# Patient Record
Sex: FEMALE | Race: WHITE | Employment: OTHER | URBAN - METROPOLITAN AREA
[De-identification: names, ages, dates, MRNs, and addresses within clinical notes are randomized per-mention and may not be internally consistent; named-entity substitution may affect disease eponyms.]

---

## 2018-02-23 ENCOUNTER — HOSPITAL ENCOUNTER (OUTPATIENT)
Dept: LAB | Age: 73
Discharge: HOME | End: 2018-02-23
Payer: MEDICARE

## 2018-02-23 LAB
ALBUMIN SERPL-MCNC: 3.7 G/DL (ref 3.4–5)
ALBUMIN/GLOB SERPL: 1.1 {RATIO} (ref 0.8–1.7)
ALP SERPL-CCNC: 90 U/L (ref 45–117)
ALT SERPL-CCNC: 24 U/L (ref 13–56)
ANION GAP SERPL CALC-SCNC: 6 MMOL/L (ref 3–18)
AST SERPL-CCNC: 21 U/L (ref 15–37)
BASOPHILS # BLD: 0 K/UL (ref 0–0.1)
BASOPHILS NFR BLD: 0 % (ref 0–2)
BILIRUB SERPL-MCNC: 0.7 MG/DL (ref 0.2–1)
BUN SERPL-MCNC: 13 MG/DL (ref 7–18)
BUN/CREAT SERPL: 17 (ref 12–20)
CALCIUM SERPL-MCNC: 9 MG/DL (ref 8.5–10.1)
CHLORIDE SERPL-SCNC: 108 MMOL/L (ref 100–108)
CO2 SERPL-SCNC: 30 MMOL/L (ref 21–32)
CREAT SERPL-MCNC: 0.75 MG/DL (ref 0.6–1.3)
DIFFERENTIAL METHOD BLD: ABNORMAL
EOSINOPHIL # BLD: 0.1 K/UL (ref 0–0.4)
EOSINOPHIL NFR BLD: 2 % (ref 0–5)
ERYTHROCYTE [DISTWIDTH] IN BLOOD BY AUTOMATED COUNT: 13.9 % (ref 11.6–14.5)
GLOBULIN SER CALC-MCNC: 3.4 G/DL (ref 2–4)
GLUCOSE SERPL-MCNC: 86 MG/DL (ref 74–99)
HCT VFR BLD AUTO: 42.4 % (ref 35–45)
HGB BLD-MCNC: 13.4 G/DL (ref 12–16)
LYMPHOCYTES # BLD: 1.6 K/UL (ref 0.9–3.6)
LYMPHOCYTES NFR BLD: 26 % (ref 21–52)
MCH RBC QN AUTO: 28.9 PG (ref 24–34)
MCHC RBC AUTO-ENTMCNC: 31.6 G/DL (ref 31–37)
MCV RBC AUTO: 91.4 FL (ref 74–97)
MONOCYTES # BLD: 0.5 K/UL (ref 0.05–1.2)
MONOCYTES NFR BLD: 9 % (ref 3–10)
NEUTS SEG # BLD: 3.7 K/UL (ref 1.8–8)
NEUTS SEG NFR BLD: 63 % (ref 40–73)
PLATELET # BLD AUTO: 203 K/UL (ref 135–420)
PMV BLD AUTO: 12.7 FL (ref 9.2–11.8)
POTASSIUM SERPL-SCNC: 4.9 MMOL/L (ref 3.5–5.5)
PROT SERPL-MCNC: 7.1 G/DL (ref 6.4–8.2)
RBC # BLD AUTO: 4.64 M/UL (ref 4.2–5.3)
SODIUM SERPL-SCNC: 144 MMOL/L (ref 136–145)
TSH SERPL DL<=0.05 MIU/L-ACNC: 1.06 UIU/ML (ref 0.36–3.74)
WBC # BLD AUTO: 5.9 K/UL (ref 4.6–13.2)

## 2018-02-23 PROCEDURE — 84443 ASSAY THYROID STIM HORMONE: CPT | Performed by: NURSE PRACTITIONER

## 2018-02-23 PROCEDURE — 36415 COLL VENOUS BLD VENIPUNCTURE: CPT | Performed by: NURSE PRACTITIONER

## 2018-02-23 PROCEDURE — 85025 COMPLETE CBC W/AUTO DIFF WBC: CPT | Performed by: NURSE PRACTITIONER

## 2018-02-23 PROCEDURE — 80053 COMPREHEN METABOLIC PANEL: CPT | Performed by: NURSE PRACTITIONER

## 2018-06-05 ENCOUNTER — ANESTHESIA EVENT (OUTPATIENT)
Dept: ENDOSCOPY | Age: 73
End: 2018-06-05
Payer: MEDICARE

## 2018-06-05 RX ORDER — FLUVASTATIN SODIUM 80 MG/1
TABLET, FILM COATED, EXTENDED RELEASE ORAL
COMMUNITY
End: 2022-08-22

## 2018-06-05 RX ORDER — CETIRIZINE HCL 10 MG
TABLET ORAL DAILY
COMMUNITY

## 2018-06-05 RX ORDER — LOSARTAN POTASSIUM 50 MG/1
100 TABLET ORAL
COMMUNITY

## 2018-06-06 ENCOUNTER — HOSPITAL ENCOUNTER (OUTPATIENT)
Age: 73
Setting detail: OUTPATIENT SURGERY
Discharge: HOME | End: 2018-06-06
Attending: INTERNAL MEDICINE | Admitting: INTERNAL MEDICINE
Payer: MEDICARE

## 2018-06-06 ENCOUNTER — ANESTHESIA (OUTPATIENT)
Dept: ENDOSCOPY | Age: 73
End: 2018-06-06
Payer: MEDICARE

## 2018-06-06 VITALS
SYSTOLIC BLOOD PRESSURE: 127 MMHG | TEMPERATURE: 97.4 F | WEIGHT: 211.4 LBS | BODY MASS INDEX: 36.09 KG/M2 | DIASTOLIC BLOOD PRESSURE: 62 MMHG | OXYGEN SATURATION: 97 % | RESPIRATION RATE: 20 BRPM | HEART RATE: 51 BPM | HEIGHT: 64 IN

## 2018-06-06 PROCEDURE — 74011250637 HC RX REV CODE- 250/637: Performed by: NURSE ANESTHETIST, CERTIFIED REGISTERED

## 2018-06-06 PROCEDURE — 88305 TISSUE EXAM BY PATHOLOGIST: CPT | Performed by: INTERNAL MEDICINE

## 2018-06-06 PROCEDURE — 88342 IMHCHEM/IMCYTCHM 1ST ANTB: CPT | Performed by: INTERNAL MEDICINE

## 2018-06-06 PROCEDURE — 76040000019: Performed by: INTERNAL MEDICINE

## 2018-06-06 PROCEDURE — 77030018846 HC SOL IRR STRL H20 ICUM -A: Performed by: INTERNAL MEDICINE

## 2018-06-06 PROCEDURE — 76060000031 HC ANESTHESIA FIRST 0.5 HR: Performed by: INTERNAL MEDICINE

## 2018-06-06 PROCEDURE — 74011250636 HC RX REV CODE- 250/636: Performed by: NURSE ANESTHETIST, CERTIFIED REGISTERED

## 2018-06-06 PROCEDURE — 77030008565 HC TBNG SUC IRR ERBE -B: Performed by: INTERNAL MEDICINE

## 2018-06-06 PROCEDURE — 74011250636 HC RX REV CODE- 250/636

## 2018-06-06 RX ORDER — SODIUM CHLORIDE 0.9 % (FLUSH) 0.9 %
5-10 SYRINGE (ML) INJECTION AS NEEDED
Status: DISCONTINUED | OUTPATIENT
Start: 2018-06-06 | End: 2018-06-06 | Stop reason: HOSPADM

## 2018-06-06 RX ORDER — FAMOTIDINE 20 MG/1
20 TABLET, FILM COATED ORAL ONCE
Status: COMPLETED | OUTPATIENT
Start: 2018-06-06 | End: 2018-06-06

## 2018-06-06 RX ORDER — SODIUM CHLORIDE 0.9 % (FLUSH) 0.9 %
5-10 SYRINGE (ML) INJECTION EVERY 8 HOURS
Status: DISCONTINUED | OUTPATIENT
Start: 2018-06-06 | End: 2018-06-06 | Stop reason: HOSPADM

## 2018-06-06 RX ORDER — PROPOFOL 10 MG/ML
INJECTION, EMULSION INTRAVENOUS
Status: DISCONTINUED | OUTPATIENT
Start: 2018-06-06 | End: 2018-06-06 | Stop reason: HOSPADM

## 2018-06-06 RX ORDER — FENTANYL CITRATE 50 UG/ML
25 INJECTION, SOLUTION INTRAMUSCULAR; INTRAVENOUS AS NEEDED
Status: CANCELLED | OUTPATIENT
Start: 2018-06-06

## 2018-06-06 RX ORDER — ONDANSETRON 2 MG/ML
4 INJECTION INTRAMUSCULAR; INTRAVENOUS ONCE
Status: CANCELLED | OUTPATIENT
Start: 2018-06-06 | End: 2018-06-06

## 2018-06-06 RX ORDER — SODIUM CHLORIDE, SODIUM LACTATE, POTASSIUM CHLORIDE, CALCIUM CHLORIDE 600; 310; 30; 20 MG/100ML; MG/100ML; MG/100ML; MG/100ML
75 INJECTION, SOLUTION INTRAVENOUS CONTINUOUS
Status: CANCELLED | OUTPATIENT
Start: 2018-06-06

## 2018-06-06 RX ORDER — INSULIN LISPRO 100 [IU]/ML
INJECTION, SOLUTION INTRAVENOUS; SUBCUTANEOUS ONCE
Status: DISCONTINUED | OUTPATIENT
Start: 2018-06-06 | End: 2018-06-06 | Stop reason: HOSPADM

## 2018-06-06 RX ORDER — DIPHENHYDRAMINE HYDROCHLORIDE 50 MG/ML
12.5 INJECTION, SOLUTION INTRAMUSCULAR; INTRAVENOUS
Status: CANCELLED | OUTPATIENT
Start: 2018-06-06

## 2018-06-06 RX ORDER — NALBUPHINE HYDROCHLORIDE 10 MG/ML
5 INJECTION, SOLUTION INTRAMUSCULAR; INTRAVENOUS; SUBCUTANEOUS
Status: CANCELLED | OUTPATIENT
Start: 2018-06-06

## 2018-06-06 RX ORDER — SODIUM CHLORIDE, SODIUM LACTATE, POTASSIUM CHLORIDE, CALCIUM CHLORIDE 600; 310; 30; 20 MG/100ML; MG/100ML; MG/100ML; MG/100ML
75 INJECTION, SOLUTION INTRAVENOUS CONTINUOUS
Status: DISCONTINUED | OUTPATIENT
Start: 2018-06-06 | End: 2018-06-06 | Stop reason: HOSPADM

## 2018-06-06 RX ORDER — PROPOFOL 10 MG/ML
INJECTION, EMULSION INTRAVENOUS AS NEEDED
Status: DISCONTINUED | OUTPATIENT
Start: 2018-06-06 | End: 2018-06-06 | Stop reason: HOSPADM

## 2018-06-06 RX ORDER — LIDOCAINE HYDROCHLORIDE 10 MG/ML
0.1 INJECTION, SOLUTION EPIDURAL; INFILTRATION; INTRACAUDAL; PERINEURAL AS NEEDED
Status: DISCONTINUED | OUTPATIENT
Start: 2018-06-06 | End: 2018-06-06 | Stop reason: HOSPADM

## 2018-06-06 RX ORDER — SODIUM CHLORIDE 0.9 % (FLUSH) 0.9 %
5-10 SYRINGE (ML) INJECTION AS NEEDED
Status: CANCELLED | OUTPATIENT
Start: 2018-06-06

## 2018-06-06 RX ADMIN — FAMOTIDINE 20 MG: 20 TABLET ORAL at 07:42

## 2018-06-06 RX ADMIN — SODIUM CHLORIDE, SODIUM LACTATE, POTASSIUM CHLORIDE, AND CALCIUM CHLORIDE 75 ML/HR: 600; 310; 30; 20 INJECTION, SOLUTION INTRAVENOUS at 07:43

## 2018-06-06 RX ADMIN — PROPOFOL 50 MG: 10 INJECTION, EMULSION INTRAVENOUS at 08:23

## 2018-06-06 RX ADMIN — PROPOFOL 50 MG: 10 INJECTION, EMULSION INTRAVENOUS at 08:17

## 2018-06-06 RX ADMIN — PROPOFOL 100 MCG/KG/MIN: 10 INJECTION, EMULSION INTRAVENOUS at 08:17

## 2018-06-06 NOTE — H&P
[facility]    [email or web address]      Impression:   1. IRIS  Intermittent vomiting  Solid food dysphagia    Plan:     1. EGD and dilate as needed      Chief Complaint: N/V IRIS      HPI:  Marissa Killian is a 74 y.o. female who is being seen on consult for the above. She was on PPI w/ partial relief but notes worsening of Sxs of the PPI. Her wt has decreased by 5 lb. She has post prandially vomiting and early satiety. .    PMH:   Past Medical History:   Diagnosis Date    GERD (gastroesophageal reflux disease)     High cholesterol     Hypertension        PSH:   Past Surgical History:   Procedure Laterality Date    HX CHOLECYSTECTOMY      HX COLONOSCOPY      HX HEMORRHOIDECTOMY      HX HYSTERECTOMY         Social HX:   Social History     Social History    Marital status:      Spouse name: N/A    Number of children: N/A    Years of education: N/A     Occupational History    Not on file. Social History Main Topics    Smoking status: Never Smoker    Smokeless tobacco: Never Used    Alcohol use No    Drug use: No    Sexual activity: Not on file     Other Topics Concern    Not on file     Social History Narrative       FHX:   History reviewed. No pertinent family history. Allergy:   Allergies   Allergen Reactions    Sulfa (Sulfonamide Antibiotics) Other (comments)     Constipation       Home Medications:     Prescriptions Prior to Admission   Medication Sig    losartan (COZAAR) 50 mg tablet Take  by mouth nightly.  fluvastatin XL (LESCOL XL) 80 mg SR tablet Take  by mouth nightly.  cetirizine (ZYRTEC) 10 mg tablet Take  by mouth daily. Review of Systems:     Constitutional: No fevers, chills, weight loss, fatigue. Skin: No rashes, pruritis, jaundice, ulcerations, erythema. HENT: No headaches, nosebleeds, sinus pressure, rhinorrhea, sore throat. Eyes: No visual changes, blurred vision, eye pain, photophobia, jaundice. Cardiovascular: No chest pain, heart palpitations. Respiratory: No cough, SOB, wheezing, chest discomfort, orthopnea. Gastrointestinal: See above   Genitourinary: No dysuria, bleeding, discharge, pyuria. Musculoskeletal: No weakness, arthralgias, wasting. Endo: No sweats. Heme: No bruising, easy bleeding. Allergies: As noted. Neurological: Cranial nerves intact. Alert and oriented. Gait not assessed. Psychiatric:  No anxiety, depression, hallucinations. Visit Vitals    /83    Pulse (!) 59    Temp 97.7 °F (36.5 °C)    Resp 20    Ht 5' 4\" (1.626 m)    Wt 95.9 kg (211 lb 6.4 oz)    SpO2 97%    Breastfeeding No    BMI 36.29 kg/m2       Physical Assessment:     constitutional: appearance: well developed, well nourished, normal habitus, no deformities, in no acute distress. skin: inspection: no rashes, ulcers, icterus or other lesions; no clubbing or telangiectasias. palpation: no induration or subcutaneos nodules. eyes: inspection: normal conjunctivae and lids; no jaundice pupils: symmetrical, normoreactive to light, normal accommodation and size. ENMT: mouth: normal oral mucosa,lips and gums; good dentition. oropharynx: normal tongue, hard and soft palate; posterior pharynx without erithema, exudate or lesions. neck: thyroid: normal size, consistency and position; no masses or tenderness. respiratory: effort: normal chest excursion; no intercostal retraction or accessory muscle use. cardiovascular: abdominal aorta: normal size and position; no bruits. palpation: PMI of normal size and position; normal rhythm; no thrill or murmurs. abdominal: abdomen: normal consistency; no tenderness or masses. hernias: no hernias appreciated. liver: normal size and consistency. spleen: not palpable. rectal: hemoccult/guaiac: not performed. musculoskeletal: digits and nails: no clubbing, cyanosis, petechiae or other inflammatory conditions.  gait: normal gait and station head and neck: normal range of motion; no pain, crepitation or contracture. spine/ribs/pelvis: normal range of motion; no pain, deformity or contracture. lymphatic: axilae: not palpable. groin: not palpable. neck: within normal limits. other: not palpable. neurologic: cranial nerves: II-XII normal.   psychiatric: judgement/insight: within normal limits. memory: within normal limits for recent and remote events. mood and affect: no evidence of depression, anxiety or agitation. orientation: oriented to time, space and person. Basic Metabolic Profile   No results for input(s): NA, K, CL, CO2, BUN, GLU, CA, MG, PHOS in the last 72 hours. No lab exists for component: CREAT      CBC w/Diff    No results for input(s): WBC, RBC, HGB, HCT, MCV, MCH, MCHC, RDW, PLT, HGBEXT, HCTEXT, PLTEXT in the last 72 hours. No lab exists for component: MPV No results for input(s): GRANS, LYMPH, EOS, PRO, MYELO, METAS, BLAST in the last 72 hours. No lab exists for component: MONO, BASO     Hepatic Function   No results for input(s): ALB, TP, TBILI, GPT, SGOT, AP, AML, LPSE in the last 72 hours. No lab exists for component: Zach Middleton MD, M.D. [facility], [address] [place] [address]  [email or web address]

## 2018-06-06 NOTE — DISCHARGE INSTRUCTIONS
Esophageal Dilation: What to Expect at [address] [place] [address]  After you have esophageal dilation, you will stay at the hospital or surgery center for 1 to 2 hours. This will allow the medicine to wear off. You will be able to go home after your doctor or nurse checks to make sure you are not having any problems. This care sheet gives you a general idea about how long it will take for you to recover. But each person recovers at a different pace. Follow the steps below to get better as quickly as possible. How can you care for yourself at home? Activity  ? · Rest as much as you need to after you go home. ? · You should be able to go back to your usual activities the day after the procedure. Diet  ? · Follow your doctor's directions for eating after the procedure. ? · Drink plenty of fluids (unless your doctor has told you not to). Medicines  ? · Your doctor will tell you if and when you can restart your medicines. He or she will also give you instructions about taking any new medicines. ? · If you take blood thinners, such as warfarin (Coumadin), clopidogrel (Plavix), or aspirin, be sure to talk to your doctor. He or she will tell you if and when to start taking those medicines again. Make sure that you understand exactly what your doctor wants you to do. ? · If you have a sore throat the day after the procedure, use an over-the-counter spray to numb your throat. Sucking on throat lozenges and gargling with warm salt water may also help relieve your symptoms. Follow-up care is a key part of your treatment and safety. Be sure to make and go to all appointments, and call your doctor if you are having problems. It's also a good idea to know your test results and keep a list of the medicines you take. When should you call for help? Call 911 anytime you think you may need emergency care. For example, call if:  ? · You passed out (lost consciousness). ? · You have trouble breathing.    ? · Your stools are maroon or very bloody   ? Call your doctor now or seek immediate medical care if:  ? · You have new or worse belly pain. ? · You have a fever. ? · You have new or more blood in your stools. ? · You are sick to your stomach and cannot drink fluids. ? · You cannot pass stools or gas. ? · You have pain that does not get better after you take pain medicine. ? Watch closely for changes in your health, and be sure to contact your doctor if:  ? · Your throat still hurts after a day or two. ? · You do not get better as expected. Where can you learn more? Go to http://ethan-roberta.info/. Enter I798 in the search box to learn more about \"Esophageal Dilation: What to Expect at Home. \"  Current as of: May 12, 2017  Content Version: 11.4  © 3112-3180 [employer]. Care instructions adapted under license by [employer] (which disclaims liability or warranty for this information). If you have questions about a medical condition or this instruction, always ask your healthcare professional. [address] any warranty or liability for your use of this information. Gastritis: Care Instructions  Your Care Instructions    Gastritis is a sore and upset stomach. It happens when something irritates the stomach lining. Many things can cause it. These include an infection such as the flu or something you ate or drank. Medicines or a sore on the lining of the stomach (ulcer) also can cause it. Your belly may bloat and ache. You may belch, vomit, and feel sick to your stomach. You should be able to relieve the problem by taking medicine. And it may help to change your diet. If gastritis lasts, your doctor may prescribe medicine. Follow-up care is a key part of your treatment and safety. Be sure to make and go to all appointments, and call your doctor if you are having problems.  It's also a good idea to know your test results and keep a list of the medicines you take.  How can you care for yourself at home? · If your doctor prescribed antibiotics, take them as directed. Do not stop taking them just because you feel better. You need to take the full course of antibiotics. · Be safe with medicines. If your doctor prescribed medicine to decrease stomach acid, take it as directed. Call your doctor if you think you are having a problem with your medicine. · Do not take any other medicine, including over-the-counter pain relievers, without talking to your doctor first.  · If your doctor recommends over-the-counter medicine to reduce stomach acid, such as Pepcid AC, Prilosec, Tagamet HB, or Zantac 75, follow the directions on the label. · Drink plenty of fluids (enough so that your urine is light yellow or clear like water) to prevent dehydration. Choose water and other caffeine-free clear liquids. If you have kidney, heart, or liver disease and have to limit fluids, talk with your doctor before you increase the amount of fluids you drink. · Limit how much alcohol you drink. · Avoid coffee, tea, cola drinks, chocolate, and other foods with caffeine. They increase stomach acid. When should you call for help? Call 911 anytime you think you may need emergency care. For example, call if:  ? · You vomit blood or what looks like coffee grounds. ? · You pass maroon or very bloody stools. ?Call your doctor now or seek immediate medical care if:  ? · You start breathing fast and have not produced urine in the last 8 hours. ? · You cannot keep fluids down. ? Watch closely for changes in your health, and be sure to contact your doctor if:  ? · You do not get better as expected. Where can you learn more? Go to http://ethan-roberta.info/. Enter [id number] in the search box to learn more about \"Gastritis: Care Instructions. \"  Current as of: May 12, 2017  Content Version: 11.4  © 1700-2509 [employer].  Care instructions adapted under license by [facility] (which disclaims liability or warranty for this information). If you have questions about a medical condition or this instruction, always ask your healthcare professional. [address] any warranty or liability for your use of this information. DISCHARGE SUMMARY from Nurse    PATIENT INSTRUCTIONS:    After general anesthesia or intravenous sedation, for 24 hours or while taking prescription Narcotics:  · Limit your activities  · Do not drive and operate hazardous machinery  · Do not make important personal or business decisions  · Do  not drink alcoholic beverages  · If you have not urinated within 8 hours after discharge, please contact your surgeon on call. *  Please give a list of your current medications to your Primary Care Provider. *  Please update this list whenever your medications are discontinued, doses are      changed, or new medications (including over-the-counter products) are added. *  Please carry medication information at all times in case of emergency situations. These are general instructions for a healthy lifestyle:    No smoking/ No tobacco products/ Avoid exposure to second hand smoke  Surgeon General's Warning:  Quitting smoking now greatly reduces serious risk to your health. Obesity, smoking, and sedentary lifestyle greatly increases your risk for illness    A healthy diet, regular physical exercise & weight monitoring are important for maintaining a healthy lifestyle    You may be retaining fluid if you have a history of heart failure or if you experience any of the following symptoms:  Weight gain of 3 pounds or more overnight or 5 pounds in a week, increased swelling in our hands or feet or shortness of breath while lying flat in bed. Please call your doctor as soon as you notice any of these symptoms; do not wait until your next office visit.     Recognize signs and symptoms of STROKE:    F-face looks uneven    A-arms unable to move or move unevenly    S-speech slurred or non-existent    T-time-call 911 as soon as signs and symptoms begin-DO NOT go       Back to bed or wait to see if you get better-TIME IS BRAIN. Warning Signs of HEART ATTACK     Call 911 if you have these symptoms:   Chest discomfort. Most heart attacks involve discomfort in the center of the chest that lasts more than a few minutes, or that goes away and comes back. It can feel like uncomfortable pressure, squeezing, fullness, or pain.  Discomfort in other areas of the upper body. Symptoms can include pain or discomfort in one or both arms, the back, neck, jaw, or stomach.  Shortness of breath with or without chest discomfort.  Other signs may include breaking out in a cold sweat, nausea, or lightheadedness. Don't wait more than five minutes to call 911 - MINUTES MATTER! Fast action can save your life. Calling 911 is almost always the fastest way to get lifesaving treatment. Emergency Medical Services staff can begin treatment when they arrive -- up to an hour sooner than if someone gets to the hospital by car. The discharge information has been reviewed with the patient. The patient verbalized understanding. Discharge medications reviewed with the patient and appropriate educational materials and side effects teaching were provided. ___________________________________________________________________________________________________________________________________  Colon Polyps: Care Instructions  Your Care Instructions    Colon polyps are growths in the colon or the rectum. The cause of most colon polyps is not known, and most people who get them do not have any problems. But a certain kind can turn into cancer. For this reason, regular testing for colon polyps is important for people age 48 and older and anyone who has an increased risk for colon cancer. Polyps are usually found through routine colon cancer screening tests.  Although most colon polyps are not cancerous, they are usually removed and then tested for cancer. Screening for colon cancer saves lives because the cancer can usually be cured if it is caught early. If you have a polyp that is the type that can turn into cancer, you may need more tests to examine your entire colon. The doctor will remove any other polyps that he or she finds, and you will be tested more often. Follow-up care is a key part of your treatment and safety. Be sure to make and go to all appointments, and call your doctor if you are having problems. It's also a good idea to know your test results and keep a list of the medicines you take. How can you care for yourself at home? Regular exams to look for colon polyps are the best way to prevent polyps from turning into colon cancer. These can include stool tests, sigmoidoscopy, colonoscopy, and CT colonography. Talk with your doctor about a testing schedule that is right for you. To prevent polyps  There is no home treatment that can prevent colon polyps. But these steps may help lower your risk for cancer. · Stay active. Being active can help you get to and stay at a healthy weight. Try to exercise on most days of the week. Walking is a good choice. · Eat well. Choose a variety of vegetables, fruits, legumes (such as peas and beans), fish, poultry, and whole grains. · Do not smoke. If you need help quitting, talk to your doctor about stop-smoking programs and medicines. These can increase your chances of quitting for good. · If you drink alcohol, limit how much you drink. Limit alcohol to 2 drinks a day for men and 1 drink a day for women. When should you call for help? Call your doctor now or seek immediate medical care if:  ? · You have severe belly pain. ? · Your stools are maroon or very bloody. ? Watch closely for changes in your health, and be sure to contact your doctor if:  ? · You have a fever. ? · You have nausea or vomiting.    ? · You have a change in bowel habits (new constipation or diarrhea). ? · Your symptoms get worse or are not improving as expected. Where can you learn more? Go to http://ethan-roberta.info/. Enter 95 [id number] in the search box to learn more about \"Colon Polyps: Care Instructions. \"  Current as of: May 12, 2017  Content Version: 11.4  © 4859-7050 [employer]. Care instructions adapted under license by [employer] (which disclaims liability or warranty for this information). If you have questions about a medical condition or this instruction, always ask your healthcare professional. [place] [address] any warranty or liability for your use of this information.

## 2018-06-06 NOTE — ANESTHESIA PREPROCEDURE EVALUATION
Anesthetic History   No history of anesthetic complications            Review of Systems / Medical History  Patient summary reviewed and pertinent labs reviewed    Pulmonary  Within defined limits                 Neuro/Psych   Within defined limits           Cardiovascular    Hypertension: well controlled          Hyperlipidemia    Exercise tolerance: >4 METS     GI/Hepatic/Renal     GERD           Endo/Other        Obesity     Other Findings   Comments: Documentation of current medication  Current medications obtained, documented and obtained? YES      Risk Factors for Postoperative nausea/vomiting:       History of postoperative nausea/vomiting? NO       Female? YES       Motion sickness? NO       Intended opioid administration for postoperative analgesia? NO      Smoking Abstinence:  Current Smoker? NO  Elective Surgery? YES  Seen preoperatively by anesthesiologist or proxy prior to day of surgery? YES  Pt abstained from smoking 24 hours prior to anesthesia?  YES    Preventive care/screening for High Blood Pressure:  Aged 18 years and older: YES  Screened for high blood pressure: YES  Patients with high blood pressure referred to primary care provider   for BP management: YES                 Physical Exam    Airway  Mallampati: IV  TM Distance: > 6 cm  Neck ROM: normal range of motion   Mouth opening: Normal     Cardiovascular  Regular rate and rhythm,  S1 and S2 normal,  no murmur, click, rub, or gallop             Dental  No notable dental hx       Pulmonary  Breath sounds clear to auscultation               Abdominal  GI exam deferred       Other Findings            Anesthetic Plan    ASA: 3  Anesthesia type: MAC          Induction: Intravenous  Anesthetic plan and risks discussed with: Patient

## 2018-06-06 NOTE — PERIOP NOTES
I have reviewed discharge instructions with the patient and son, Eden Sanchez. The patient and son verbalized understanding. Patient armband removed and shredded.

## 2018-06-06 NOTE — IP AVS SNAPSHOT
[address] [facility] 
 
 
 [address] Patient: Nicole Barraza MRN: [id number] :1945 About your hospitalization You were admitted on:  2018 You last received care in the:  [facility] PHASE 2 RECOVERY You were discharged on:  2018 Why you were hospitalized Your primary diagnosis was:  Not on File Follow-up Information Follow up With Details Comments Contact Info Jessica Chanel DO   [address] [facility] 
[phone] Susana Pelletier MD  Follow up within 8 to 12 weeks [address] [place] [address] Drive [address] 706 [facility] 
[phone] Discharge Orders None A check rian indicates which time of day the medication should be taken. My Medications CONTINUE taking these medications Instructions Each Dose to Equal  
 Morning Noon Evening Bedtime LESCOL XL 80 mg SR tablet Generic drug:  fluvastatin XL Your last dose was: Your next dose is: Take  by mouth nightly. losartan 50 mg tablet Commonly known as:  COZAAR Your last dose was: Your next dose is: Take  by mouth nightly. ZyrTEC 10 mg tablet Generic drug:  cetirizine Your last dose was: Your next dose is: Take  by mouth daily. Discharge Instructions Esophageal Dilation: What to Expect at [facility] Your Recovery After you have esophageal dilation, you will stay at the hospital or surgery center for 1 to 2 hours. This will allow the medicine to wear off. You will be able to go home after your doctor or nurse checks to make sure you are not having any problems. This care sheet gives you a general idea about how long it will take for you to recover. But each person recovers at a different pace.  Follow the steps below to get better as quickly as possible. How can you care for yourself at home? Activity ? · Rest as much as you need to after you go home. ? · You should be able to go back to your usual activities the day after the procedure. Diet ? · Follow your doctor's directions for eating after the procedure. ? · Drink plenty of fluids (unless your doctor has told you not to). Medicines ? · Your doctor will tell you if and when you can restart your medicines. He or she will also give you instructions about taking any new medicines. ? · If you take blood thinners, such as warfarin (Coumadin), clopidogrel (Plavix), or aspirin, be sure to talk to your doctor. He or she will tell you if and when to start taking those medicines again. Make sure that you understand exactly what your doctor wants you to do. ? · If you have a sore throat the day after the procedure, use an over-the-counter spray to numb your throat. Sucking on throat lozenges and gargling with warm salt water may also help relieve your symptoms. Follow-up care is a key part of your treatment and safety. Be sure to make and go to all appointments, and call your doctor if you are having problems. It's also a good idea to know your test results and keep a list of the medicines you take. When should you call for help? Call 911 anytime you think you may need emergency care. For example, call if: 
? · You passed out (lost consciousness). ? · You have trouble breathing. ? · Your stools are maroon or very bloody ? Call your doctor now or seek immediate medical care if: 
? · You have new or worse belly pain. ? · You have a fever. ? · You have new or more blood in your stools. ? · You are sick to your stomach and cannot drink fluids. ? · You cannot pass stools or gas. ? · You have pain that does not get better after you take pain medicine. ? Watch closely for changes in your health, and be sure to contact your doctor if: ? · Your throat still hurts after a day or two. ? · You do not get better as expected. Where can you learn more? Go to http://ethan-roberta.info/. Enter Z386 in the search box to learn more about \"Esophageal Dilation: What to Expect at Home. \" Current as of: May 12, 2017 Content Version: 11.4 © 1626-2751 [employer]. Care instructions adapted under license by [employer] (which disclaims liability or warranty for this information). If you have questions about a medical condition or this instruction, always ask your healthcare professional. [place] [address] any warranty or liability for your use of this information. Gastritis: Care Instructions Your Care Instructions Gastritis is a sore and upset stomach. It happens when something irritates the stomach lining. Many things can cause it. These include an infection such as the flu or something you ate or drank. Medicines or a sore on the lining of the stomach (ulcer) also can cause it. Your belly may bloat and ache. You may belch, vomit, and feel sick to your stomach. You should be able to relieve the problem by taking medicine. And it may help to change your diet. If gastritis lasts, your doctor may prescribe medicine. Follow-up care is a key part of your treatment and safety. Be sure to make and go to all appointments, and call your doctor if you are having problems. It's also a good idea to know your test results and keep a list of the medicines you take. How can you care for yourself at home? · If your doctor prescribed antibiotics, take them as directed. Do not stop taking them just because you feel better. You need to take the full course of antibiotics. · Be safe with medicines. If your doctor prescribed medicine to decrease stomach acid, take it as directed. Call your doctor if you think you are having a problem with your medicine. · Do not take any other medicine, including over-the-counter pain relievers, without talking to your doctor first. 
· If your doctor recommends over-the-counter medicine to reduce stomach acid, such as Pepcid AC, Prilosec, Tagamet HB, or Zantac 75, follow the directions on the label. · Drink plenty of fluids (enough so that your urine is light yellow or clear like water) to prevent dehydration. Choose water and other caffeine-free clear liquids. If you have kidney, heart, or liver disease and have to limit fluids, talk with your doctor before you increase the amount of fluids you drink. · Limit how much alcohol you drink. · Avoid coffee, tea, cola drinks, chocolate, and other foods with caffeine. They increase stomach acid. When should you call for help? Call 911 anytime you think you may need emergency care. For example, call if: 
? · You vomit blood or what looks like coffee grounds. ? · You pass maroon or very bloody stools. ?Call your doctor now or seek immediate medical care if: 
? · You start breathing fast and have not produced urine in the last 8 hours. ? · You cannot keep fluids down. ? Watch closely for changes in your health, and be sure to contact your doctor if: 
? · You do not get better as expected. Where can you learn more? Go to http://ethan-roberat.info/. Enter [id number] in the search box to learn more about \"Gastritis: Care Instructions. \" Current as of: May 12, 2017 Content Version: 11.4 © 6850-8639 [employer]. Care instructions adapted under license by [employer] (which disclaims liability or warranty for this information). If you have questions about a medical condition or this instruction, always ask your healthcare professional. [place] [address] any warranty or liability for your use of this information. DISCHARGE SUMMARY from Nurse PATIENT INSTRUCTIONS: 
 
 After general anesthesia or intravenous sedation, for 24 hours or while taking prescription Narcotics: · Limit your activities · Do not drive and operate hazardous machinery · Do not make important personal or business decisions · Do  not drink alcoholic beverages · If you have not urinated within 8 hours after discharge, please contact your surgeon on call. *  Please give a list of your current medications to your Primary Care Provider. *  Please update this list whenever your medications are discontinued, doses are 
    changed, or new medications (including over-the-counter products) are added. *  Please carry medication information at all times in case of emergency situations. These are general instructions for a healthy lifestyle: No smoking/ No tobacco products/ Avoid exposure to second hand smoke Surgeon General's Warning:  Quitting smoking now greatly reduces serious risk to your health. Obesity, smoking, and sedentary lifestyle greatly increases your risk for illness A healthy diet, regular physical exercise & weight monitoring are important for maintaining a healthy lifestyle You may be retaining fluid if you have a history of heart failure or if you experience any of the following symptoms:  Weight gain of 3 pounds or more overnight or 5 pounds in a week, increased swelling in our hands or feet or shortness of breath while lying flat in bed. Please call your doctor as soon as you notice any of these symptoms; do not wait until your next office visit. Recognize signs and symptoms of STROKE: 
 
F-face looks uneven A-arms unable to move or move unevenly S-speech slurred or non-existent T-time-call 911 as soon as signs and symptoms begin-DO NOT go Back to bed or wait to see if you get better-TIME IS BRAIN. Warning Signs of HEART ATTACK Call 911 if you have these symptoms: 
? Chest discomfort.  Most heart attacks involve discomfort in the center of the chest that lasts more than a few minutes, or that goes away and comes back. It can feel like uncomfortable pressure, squeezing, fullness, or pain. ? Discomfort in other areas of the upper body. Symptoms can include pain or discomfort in one or both arms, the back, neck, jaw, or stomach. ? Shortness of breath with or without chest discomfort. ? Other signs may include breaking out in a cold sweat, nausea, or lightheadedness. Don't wait more than five minutes to call [address]! Fast action can save your life. Calling 911 is almost always the fastest way to get lifesaving treatment. Emergency Medical Services staff can begin treatment when they arrive  up to an hour sooner than if someone gets to the hospital by car. The discharge information has been reviewed with the patient. The patient verbalized understanding. Discharge medications reviewed with the patient and appropriate educational materials and side effects teaching were provided. ___________________________________________________________________________________________________________________________________ Colon Polyps: Care Instructions Your Care Instructions Colon polyps are growths in the colon or the rectum. The cause of most colon polyps is not known, and most people who get them do not have any problems. But a certain kind can turn into cancer. For this reason, regular testing for colon polyps is important for people age 48 and older and anyone who has an increased risk for colon cancer. Polyps are usually found through routine colon cancer screening tests. Although most colon polyps are not cancerous, they are usually removed and then tested for cancer. Screening for colon cancer saves lives because the cancer can usually be cured if it is caught early. If you have a polyp that is the type that can turn into cancer, you may need more tests to examine your entire colon.  The doctor will remove any other polyps that he or she finds, and you will be tested more often. Follow-up care is a key part of your treatment and safety. Be sure to make and go to all appointments, and call your doctor if you are having problems. It's also a good idea to know your test results and keep a list of the medicines you take. How can you care for yourself at home? Regular exams to look for colon polyps are the best way to prevent polyps from turning into colon cancer. These can include stool tests, sigmoidoscopy, colonoscopy, and CT colonography. Talk with your doctor about a testing schedule that is right for you. To prevent polyps There is no home treatment that can prevent colon polyps. But these steps may help lower your risk for cancer. · Stay active. Being active can help you get to and stay at a healthy weight. Try to exercise on most days of the week. Walking is a good choice. · Eat well. Choose a variety of vegetables, fruits, legumes (such as peas and beans), fish, poultry, and whole grains. · Do not smoke. If you need help quitting, talk to your doctor about stop-smoking programs and medicines. These can increase your chances of quitting for good. · If you drink alcohol, limit how much you drink. Limit alcohol to 2 drinks a day for men and 1 drink a day for women. When should you call for help? Call your doctor now or seek immediate medical care if: 
? · You have severe belly pain. ? · Your stools are maroon or very bloody. ? Watch closely for changes in your health, and be sure to contact your doctor if: 
? · You have a fever. ? · You have nausea or vomiting. ? · You have a change in bowel habits (new constipation or diarrhea). ? · Your symptoms get worse or are not improving as expected. Where can you learn more? Go to http://ethan-roberta.info/. Enter [id number] in the search box to learn more about \"Colon Polyps: Care Instructions. \" Current as of: May 12, 2017 Content Version: 11.4 © 2431-5893 Healthwise, [employer]. Care instructions adapted under license by [employer] (which disclaims liability or warranty for this information). If you have questions about a medical condition or this instruction, always ask your healthcare professional. [address] any warranty or liability for your use of this information. Introducing \[facility] [place] [facility]\"" & HEALTH SERVICES! [employer] introduces Tabtor patient portal. Now you can access parts of your medical record, email your doctor's office, and request medication refills online. 1. In your internet browser, go to [email or web address] 2. Click on the First Time User? Click Here link in the Sign In box. You will see the New Member Sign Up page. 3. Enter your Tabtor Access Code exactly as it appears below. You will not need to use this code after youve completed the sign-up process. If you do not sign up before the expiration date, you must request a new code. · Tabtor Access Code: [email or web address] Expires: 9/3/2018 10:55 AM 
 
4. Enter the last four digits of your Social Security Number (xxxx) and Date of Birth (mm/dd/yyyy) as indicated and click Submit. You will be taken to the next sign-up page. 5. Create a Tabtor ID. This will be your Tabtor login ID and cannot be changed, so think of one that is secure and easy to remember. 6. Create a Tabtor password. You can change your password at any time. 7. Enter your Password Reset Question and Answer. This can be used at a later time if you forget your password. 8. Enter your e-mail address. You will receive e-mail notification when new information is available in [address] 9. Click Sign Up. You can now view and download portions of your medical record. 10. Click the Download Summary menu link to download a portable copy of your medical information. If you have questions, please visit the Frequently Asked Questions section of the MyChart website. Remember, [employer] is NOT to be used for urgent needs. For medical emergencies, dial 911. Now available from your iPhone and Android! Introducing Jatinder Horn As a [employer] patient, I wanted to make you aware of our electronic visit tool called Jatinder Horn. [employer] 24/7 allows you to connect within minutes with a medical provider 24 hours a day, seven days a week via a mobile device or tablet or logging into a secure website from your computer. You can access Jatinder Horn from anywhere in the [place]. A virtual visit might be right for you when you have a simple condition and feel like you just dont want to get out of bed, or cant get away from work for an appointment, when your regular [employer] provider is not available (evenings, weekends or holidays), or when youre out of town and need minor care. Electronic visits cost only $49 and if the [employer] 24/7 provider determines a prescription is needed to treat your condition, one can be electronically transmitted to a nearby pharmacy*. Please take a moment to enroll today if you have not already done so. The enrollment process is free and takes just a few minutes. To enroll, please download the [employer] 24/7 mary to your tablet or phone, or visit [email or web address] to enroll on your computer. And, as an [address] [place] [address] patient with a [employer] account, the results of your visits will be scanned into your electronic medical record and your primary care provider will be able to view the scanned results. We urge you to continue to see your regular [employer] provider for your ongoing medical care.   And while your primary care provider may not be the one available when you seek a Jatinder Horn virtual visit, the peace of mind you get from getting a real diagnosis real time can be priceless. For more information on Jatinder Horn, view our Frequently Asked Questions (FAQs) at [email or web address]. Sincerely, 
 
Sylvie Hoffmann MD 
Chief Medical Officer [address] *:  certain medications cannot be prescribed via Jatinder Horn Providers Seen During Your Hospitalization Provider Specialty Primary office phone Hortensia Love MD Gastroenterology [phone] Your Primary Care Physician (PCP) Primary Care Physician Office Phone Office Fax Lilliamsriramgeovanny Jarquin [phone] You are allergic to the following Allergen Reactions Sulfa (Sulfonamide Antibiotics) Other (comments) Constipation Recent Documentation Height Weight Breastfeeding? BMI OB Status Smoking Status 1.626 m 95.9 kg No 36.29 kg/m2 Hysterectomy Never Smoker Emergency Contacts Name Discharge Info Relation Home Work Mobile Geoffrey Haynes DISCHARGE CAREGIVER [3] Son [22] [phone] Patient Belongings The following personal items are in your possession at time of discharge: 
  Dental Appliances: None  Visual Aid: Glasses Please provide this summary of care documentation to your next provider. Signatures-by signing, you are acknowledging that this After Visit Summary has been reviewed with you and you have received a copy. Patient Signature:  ____________________________________________________________ Date:  ____________________________________________________________  
  
Yanna Mcnamara Provider Signature:  ____________________________________________________________ Date:  ____________________________________________________________

## 2018-06-06 NOTE — ANESTHESIA POSTPROCEDURE EVALUATION
Post-Anesthesia Evaluation and Assessment    Patient: Joelle Capps MRN: [id number]  SSN: [id number]    YOB: 1945  Age: 74 y.o. Sex: female       Cardiovascular Function/Vital Signs  Visit Vitals    /54    Pulse (!) 52    Temp 36.1 °C (97 °F)    Resp 14    Ht 5' 4\" (1.626 m)    Wt 95.9 kg (211 lb 6.4 oz)    SpO2 99%    Breastfeeding No    BMI 36.29 kg/m2       Patient is status post MAC anesthesia for Procedure(s):  ENDOSCOPY with bx's and dilation. Nausea/Vomiting: None    Postoperative hydration reviewed and adequate. Pain:  Pain Scale 1: Visual (06/06/18 0829)  Pain Intensity 1: 0 (06/06/18 0829)   Managed    Neurological Status: At baseline    Mental Status and Level of Consciousness: Arousable    Pulmonary Status:   O2 Device: Room air (06/06/18 0829)   Adequate oxygenation and airway patent    Complications related to anesthesia: None    Post-anesthesia assessment completed.  No concerns        Signed By: Dejon Jackson MD     June 6, 2018

## 2018-08-15 ENCOUNTER — ANESTHESIA EVENT (OUTPATIENT)
Dept: ENDOSCOPY | Age: 73
End: 2018-08-15
Payer: MEDICARE

## 2018-08-16 ENCOUNTER — ANESTHESIA (OUTPATIENT)
Dept: ENDOSCOPY | Age: 73
End: 2018-08-16
Payer: MEDICARE

## 2018-08-16 ENCOUNTER — HOSPITAL ENCOUNTER (OUTPATIENT)
Age: 73
Setting detail: OUTPATIENT SURGERY
Discharge: HOME | End: 2018-08-16
Attending: INTERNAL MEDICINE | Admitting: INTERNAL MEDICINE
Payer: MEDICARE

## 2018-08-16 VITALS
BODY MASS INDEX: 36.19 KG/M2 | SYSTOLIC BLOOD PRESSURE: 130 MMHG | RESPIRATION RATE: 23 BRPM | OXYGEN SATURATION: 100 % | WEIGHT: 212 LBS | TEMPERATURE: 97.5 F | HEIGHT: 64 IN | HEART RATE: 62 BPM | DIASTOLIC BLOOD PRESSURE: 94 MMHG

## 2018-08-16 PROCEDURE — 74011250636 HC RX REV CODE- 250/636: Performed by: NURSE ANESTHETIST, CERTIFIED REGISTERED

## 2018-08-16 PROCEDURE — 76040000019: Performed by: INTERNAL MEDICINE

## 2018-08-16 PROCEDURE — 74011250636 HC RX REV CODE- 250/636

## 2018-08-16 PROCEDURE — 74011000250 HC RX REV CODE- 250: Performed by: NURSE ANESTHETIST, CERTIFIED REGISTERED

## 2018-08-16 PROCEDURE — 74011250637 HC RX REV CODE- 250/637: Performed by: INTERNAL MEDICINE

## 2018-08-16 PROCEDURE — 74011000250 HC RX REV CODE- 250

## 2018-08-16 PROCEDURE — 76060000032 HC ANESTHESIA 0.5 TO 1 HR: Performed by: INTERNAL MEDICINE

## 2018-08-16 RX ORDER — LIDOCAINE HYDROCHLORIDE 20 MG/ML
INJECTION, SOLUTION EPIDURAL; INFILTRATION; INTRACAUDAL; PERINEURAL AS NEEDED
Status: DISCONTINUED | OUTPATIENT
Start: 2018-08-16 | End: 2018-08-16 | Stop reason: HOSPADM

## 2018-08-16 RX ORDER — EPHEDRINE SULFATE/0.9% NACL/PF 25 MG/5 ML
SYRINGE (ML) INTRAVENOUS AS NEEDED
Status: DISCONTINUED | OUTPATIENT
Start: 2018-08-16 | End: 2018-08-16 | Stop reason: HOSPADM

## 2018-08-16 RX ORDER — PROPOFOL 10 MG/ML
INJECTION, EMULSION INTRAVENOUS AS NEEDED
Status: DISCONTINUED | OUTPATIENT
Start: 2018-08-16 | End: 2018-08-16 | Stop reason: HOSPADM

## 2018-08-16 RX ORDER — DEXTROMETHORPHAN/PSEUDOEPHED 2.5-7.5/.8
DROPS ORAL AS NEEDED
Status: DISCONTINUED | OUTPATIENT
Start: 2018-08-16 | End: 2018-08-16 | Stop reason: HOSPADM

## 2018-08-16 RX ORDER — SODIUM CHLORIDE, SODIUM LACTATE, POTASSIUM CHLORIDE, CALCIUM CHLORIDE 600; 310; 30; 20 MG/100ML; MG/100ML; MG/100ML; MG/100ML
50 INJECTION, SOLUTION INTRAVENOUS CONTINUOUS
Status: DISCONTINUED | OUTPATIENT
Start: 2018-08-16 | End: 2018-08-16 | Stop reason: HOSPADM

## 2018-08-16 RX ADMIN — PROPOFOL 50 MG: 10 INJECTION, EMULSION INTRAVENOUS at 10:34

## 2018-08-16 RX ADMIN — PROPOFOL 50 MG: 10 INJECTION, EMULSION INTRAVENOUS at 10:45

## 2018-08-16 RX ADMIN — LIDOCAINE HYDROCHLORIDE 100 MG: 20 INJECTION, SOLUTION EPIDURAL; INFILTRATION; INTRACAUDAL; PERINEURAL at 10:34

## 2018-08-16 RX ADMIN — PROPOFOL 30 MG: 10 INJECTION, EMULSION INTRAVENOUS at 10:36

## 2018-08-16 RX ADMIN — PROPOFOL 30 MG: 10 INJECTION, EMULSION INTRAVENOUS at 10:35

## 2018-08-16 RX ADMIN — FAMOTIDINE 20 MG: 10 INJECTION, SOLUTION INTRAVENOUS at 10:26

## 2018-08-16 RX ADMIN — PROPOFOL 50 MG: 10 INJECTION, EMULSION INTRAVENOUS at 10:37

## 2018-08-16 RX ADMIN — Medication 5 MG: at 10:52

## 2018-08-16 RX ADMIN — SODIUM CHLORIDE, SODIUM LACTATE, POTASSIUM CHLORIDE, AND CALCIUM CHLORIDE 50 ML/HR: 600; 310; 30; 20 INJECTION, SOLUTION INTRAVENOUS at 10:26

## 2018-08-16 NOTE — DISCHARGE INSTRUCTIONS
Colonoscopy: What to Expect at [address] [place] [address]  After you have a colonoscopy, you will stay at the clinic for 1 to 2 hours until the medicines wear off. Then you can go home. But you will need to arrange for a ride. Your doctor will tell you when you can eat and do your other usual activities. Your doctor will talk to you about when you will need your next colonoscopy. Your doctor can help you decide how often you need to be checked. This will depend on the results of your test and your risk for colorectal cancer. After the test, you may be bloated or have gas pains. You may need to pass gas. If a biopsy was done or a polyp was removed, you may have streaks of blood in your stool (feces) for a few days. This care sheet gives you a general idea about how long it will take for you to recover. But each person recovers at a different pace. Follow the steps below to get better as quickly as possible. How can you care for yourself at home? Activity  · Rest when you feel tired. · You can do your normal activities when it feels okay to do so. Diet  · Follow your doctor's directions for eating. · Unless your doctor has told you not to, drink plenty of fluids. This helps to replace the fluids that were lost during the colon prep. · Do not drink alcohol. Medicines  · Your doctor will tell you if and when you can restart your medicines. He or she will also give you instructions about taking any new medicines. · If you take blood thinners, such as warfarin (Coumadin), clopidogrel (Plavix), or aspirin, be sure to talk to your doctor. He or she will tell you if and when to start taking those medicines again. Make sure that you understand exactly what your doctor wants you to do. · If polyps were removed or a biopsy was done during the test, your doctor may tell you not to take aspirin or other anti-inflammatory medicines for a few days. These include ibuprofen (Advil, Motrin) and naproxen (Aleve).   Other instructions  · For your safety, do not drive or operate machinery until the medicine wears off and you can think clearly. Your doctor may tell you not to drive or operate machinery until the day after your test.  · Do not sign legal documents or make major decisions until the medicine wears off and you can think clearly. The anesthesia can make it hard for you to fully understand what you are agreeing to. Follow-up care is a key part of your treatment and safety. Be sure to make and go to all appointments, and call your doctor if you are having problems. It's also a good idea to know your test results and keep a list of the medicines you take. When should you call for help? Call 911 anytime you think you may need emergency care. For example, call if:  · You passed out (lost consciousness). · You pass maroon or bloody stools. · You have severe belly pain. Call your doctor now or seek immediate medical care if:  · Your stools are black and tarlike. · Your stools have streaks of blood, but you did not have a biopsy or any polyps removed. · You have belly pain, or your belly is swollen and firm. · You vomit. · You have a fever. · You are very dizzy. Watch closely for changes in your health, and be sure to contact your doctor if you have any problems. Where can you learn more? Go to [employer]  Enter E264 in the search box to learn more about \"Colonoscopy: What to Expect at Home. \"   © 4824-0283 [employer]. Care instructions adapted under license by [employer] (which disclaims liability or warranty for this information). This care instruction is for use with your licensed healthcare professional. If you have questions about a medical condition or this instruction, always ask your healthcare professional. [place] [address] any warranty or liability for your use of this information.   Content Version: 10.8.448458; Current as of: November 20, 2015                  DISCHARGE SUMMARY from Nurse     POST-PROCEDURE INSTRUCTIONS:    Call your Physician if you:  ? Observe any excess bleeding. ? Develop a temperature over 100.5o F.  ? Experience abdominal, shoulder or chest pain. ? Notice any signs of decreased circulation or nerve impairment to an extremity such as a change in color, persistent numbness, tingling, coldness or increase in pain. ? Vomit blood or you have nausea and vomiting lasting longer than 4 hours. ? Are unable to take medications. ? Are unable to urinate within 8 hours after discharge following general anesthesia or intravenous sedation. For the next 24 hours after receiving general anesthesia or intravenous sedation, or while taking prescription Narcotics, limit your activities:  ? Do NOT drive a motor vehicle, operate hazard machinery or power tools, or perform tasks that require coordination. The medication you received during your procedure may have some effect on your mental awareness. ? Do NOT make important personal or business decisions. The medication you received during your procedure may have some effect on your mental awareness. ? Do NOT drink alcoholic beverages. These drinks do not mix well with the medications that have been given to you. ? Upon discharge from the hospital, you must be accompanied by a responsible adult. ? Resume your diet as directed by your physician. ? Resume medications as your physician has prescribed. ? Please give a list of your current medications to your Primary Care Provider. ? Please update this list whenever your medications are discontinued, doses are changed, or new medications (including over-the-counter products) are added. ? Please carry medication information at all times in case of emergency situations. These are general instructions for a healthy lifestyle:    No smoking/ No tobacco products/ Avoid exposure to second hand smoke.    Surgeon General's Warning: Quitting smoking now greatly reduces serious risk to your health. Obesity, smoking, and a sedentary lifestyle greatly increase your risk for illness.  A healthy diet, regular physical exercise & weight monitoring are important for maintaining a healthy lifestyle   You may be retaining fluid if you have a history of heart failure or if you experience any of the following symptoms:  Weight gain of 3 pounds or more overnight or 5 pounds in a week, increased swelling in our hands or feet or shortness of breath while lying flat in bed. Please call your doctor as soon as you notice any of these symptoms; do not wait until your next office visit. Recognize signs and symptoms of STROKE:  F  -  Face looks uneven  A  -  Arms unable to move or move unevenly  S  -  Speech slurred or non-existent  T  -  Time to call 911 - as soon as signs and symptoms begin - DO NOT go back to bed or wait to see If you get better - TIME IS BRAIN. Colorectal Screening   Colorectal cancer almost always develops from precancerous polyps (abnormal growths) in the colon or rectum. Screening tests can find precancerous polyps, so that they can be removed before they turn into cancer. Screening tests can also find colorectal cancer early, when treatment works best.  Rosanna Speak with your physician about when you should begin screening and how often you should be tested. Educational references and/or instructions provided during this visit included:    normal      APPOINTMENTS:    Please make a follow-up appointment with your physician. Discharge information has been reviewed with the patient. The patient verbalized understanding.

## 2018-08-16 NOTE — ANESTHESIA PREPROCEDURE EVALUATION
Anesthetic History   No history of anesthetic complications            Review of Systems / Medical History  Patient summary reviewed and pertinent labs reviewed    Pulmonary  Within defined limits                 Neuro/Psych   Within defined limits           Cardiovascular    Hypertension          Hyperlipidemia    Exercise tolerance: >4 METS     GI/Hepatic/Renal  Within defined limits              Endo/Other        Morbid obesity     Other Findings   Comments: Documentation of current medication  Current medications obtained, documented and obtained? YES      Risk Factors for Postoperative nausea/vomiting:       History of postoperative nausea/vomiting? NO       Female? NO       Motion sickness? NO       Intended opioid administration for postoperative analgesia? NO      Smoking Abstinence:  Current Smoker? NO  Elective Surgery? YES  Seen preoperatively by anesthesiologist or proxy prior to day of surgery? YES  Pt abstained from smoking 24 hours prior to anesthesia?  N/A    Preventive care/screening for High Blood Pressure:  Aged 18 years and older: YES  Screened for high blood pressure: YES  Patients with high blood pressure referred to primary care provider   for BP management: YES           Physical Exam    Airway  Mallampati: III  TM Distance: 4 - 6 cm  Neck ROM: decreased range of motion, short neck   Mouth opening: Diminished (comment)     Cardiovascular  Regular rate and rhythm,  S1 and S2 normal,  no murmur, click, rub, or gallop  Rhythm: regular  Rate: normal         Dental    Dentition: Lower dentition intact and Upper dentition intact     Pulmonary  Breath sounds clear to auscultation               Abdominal  GI exam deferred       Other Findings            Anesthetic Plan    ASA: 3  Anesthesia type: MAC          Induction: Intravenous  Anesthetic plan and risks discussed with: Patient

## 2018-08-16 NOTE — ANESTHESIA POSTPROCEDURE EVALUATION
Post-Anesthesia Evaluation and Assessment    Patient: Mark Santos MRN: [id number]  SSN: [id number]    YOB: 1945  Age: 74 y.o. Sex: female       Cardiovascular Function/Vital Signs  Visit Vitals    /51    Pulse 69    Temp 36.4 °C (97.5 °F)    Resp 13    Ht 5' 4\" (1.626 m)    Wt 96.2 kg (212 lb)    SpO2 100%    BMI 36.39 kg/m2       Patient is status post MAC anesthesia for Procedure(s):  COLONOSCOPY. Nausea/Vomiting: None    Postoperative hydration reviewed and adequate. Pain:  Pain Scale 1: Numeric (0 - 10) (08/16/18 1017)  Pain Intensity 1: 0 (08/16/18 1017)   Managed    Neurological Status: At baseline    Mental Status and Level of Consciousness: Arousable    Pulmonary Status:   O2 Device: Oxygen mask (08/16/18 1055)   Adequate oxygenation and airway patent    Complications related to anesthesia: None    Post-anesthesia assessment completed.  No concerns    Signed By: Dave Miller MD     August 16, 2018

## 2018-08-16 NOTE — H&P
[facility]    [email or web address]      Impression:   1.crcs      Plan:   colo  1. Chief Complaint:   constipation    HPI:  Caryl Grullon is a 74 y.o. female who is being seen on consult for the above. No bleeding, No FHx of colon CA. Lasdt colo >10 yr ago. Has occ constipation. Aimee Raf PMH:   Past Medical History:   Diagnosis Date    GERD (gastroesophageal reflux disease)     High cholesterol     Hypertension        PSH:   Past Surgical History:   Procedure Laterality Date    HX CHOLECYSTECTOMY      HX COLONOSCOPY      HX HEMORRHOIDECTOMY      HX HYSTERECTOMY         Social HX:   Social History     Social History    Marital status:      Spouse name: N/A    Number of children: N/A    Years of education: N/A     Occupational History    Not on file. Social History Main Topics    Smoking status: Never Smoker    Smokeless tobacco: Never Used    Alcohol use No    Drug use: No    Sexual activity: Not on file     Other Topics Concern    Not on file     Social History Narrative       FHX:   History reviewed. No pertinent family history. Allergy:   Allergies   Allergen Reactions    Sulfa (Sulfonamide Antibiotics) Other (comments)     Constipation       Home Medications:     Prescriptions Prior to Admission   Medication Sig    losartan (COZAAR) 50 mg tablet Take  by mouth nightly.  fluvastatin XL (LESCOL XL) 80 mg SR tablet Take  by mouth nightly.  cetirizine (ZYRTEC) 10 mg tablet Take  by mouth daily. Review of Systems:     Constitutional: No fevers, chills, weight loss, fatigue. Skin: No rashes, pruritis, jaundice, ulcerations, erythema. HENT: No headaches, nosebleeds, sinus pressure, rhinorrhea, sore throat. Eyes: No visual changes, blurred vision, eye pain, photophobia, jaundice. Cardiovascular: No chest pain, heart palpitations. Respiratory: No cough, SOB, wheezing, chest discomfort, orthopnea. Gastrointestinal: See above   Genitourinary: No dysuria, bleeding, discharge, pyuria. Musculoskeletal: No weakness, arthralgias, wasting. Endo: No sweats. Heme: No bruising, easy bleeding. Allergies: As noted. Neurological: Cranial nerves intact. Alert and oriented. Gait not assessed. Psychiatric:  No anxiety, depression, hallucinations. Visit Vitals    /69    Pulse 64    Temp 97.5 °F (36.4 °C)    Resp 20    Ht 5' 4\" (1.626 m)    Wt 96.2 kg (212 lb)    SpO2 98%    BMI 36.39 kg/m2       Physical Assessment:     constitutional: appearance: well developed, well nourished, normal habitus, no deformities, in no acute distress. skin: inspection: no rashes, ulcers, icterus or other lesions; no clubbing or telangiectasias. palpation: no induration or subcutaneos nodules. eyes: inspection: normal conjunctivae and lids; no jaundice pupils: symmetrical, normoreactive to light, normal accommodation and size. ENMT: mouth: normal oral mucosa,lips and gums; good dentition. oropharynx: normal tongue, hard and soft palate; posterior pharynx without erithema, exudate or lesions. neck: thyroid: normal size, consistency and position; no masses or tenderness. respiratory: effort: normal chest excursion; no intercostal retraction or accessory muscle use. cardiovascular: abdominal aorta: normal size and position; no bruits. palpation: PMI of normal size and position; normal rhythm; no thrill or murmurs. abdominal: abdomen: normal consistency; no tenderness or masses. hernias: no hernias appreciated. liver: normal size and consistency. spleen: not palpable. rectal: hemoccult/guaiac: not performed. musculoskeletal: digits and nails: no clubbing, cyanosis, petechiae or other inflammatory conditions. gait: normal gait and station head and neck: normal range of motion; no pain, crepitation or contracture. spine/ribs/pelvis: normal range of motion; no pain, deformity or contracture. lymphatic: axilae: not palpable. groin: not palpable. neck: within normal limits. other: not palpable. neurologic: cranial nerves: II-XII normal.   psychiatric: judgement/insight: within normal limits. memory: within normal limits for recent and remote events. mood and affect: no evidence of depression, anxiety or agitation. orientation: oriented to time, space and person. Basic Metabolic Profile   No results for input(s): NA, K, CL, CO2, BUN, GLU, CA, MG, PHOS in the last 72 hours. No lab exists for component: CREAT      CBC w/Diff    No results for input(s): WBC, RBC, HGB, HCT, MCV, MCH, MCHC, RDW, PLT, HGBEXT, HCTEXT, PLTEXT in the last 72 hours. No lab exists for component: MPV No results for input(s): GRANS, LYMPH, EOS, PRO, MYELO, METAS, BLAST in the last 72 hours. No lab exists for component: MONO, BASO     Hepatic Function   No results for input(s): ALB, TP, TBILI, GPT, SGOT, AP, AML, LPSE in the last 72 hours. No lab exists for component: Janet Ratliff MD, M.D. [facility], [address] [place] [address]  [email or web address]

## 2018-08-16 NOTE — IP AVS SNAPSHOT
[address] [place] [id number] [phone] Patient: Mary Jane Farias MRN: [id number] :1945 A check rian indicates which time of day the medication should be taken. My Medications CONTINUE taking these medications Instructions Each Dose to Equal  
 Morning Noon Evening Bedtime LESCOL XL 80 mg SR tablet Generic drug:  fluvastatin XL Your last dose was: Your next dose is: Take  by mouth nightly. losartan 50 mg tablet Commonly known as:  COZAAR Your last dose was: Your next dose is: Take  by mouth nightly. ZyrTEC 10 mg tablet Generic drug:  cetirizine Your last dose was: Your next dose is: Take  by mouth daily.

## 2018-08-16 NOTE — IP AVS SNAPSHOT
[address] [facility], [place], operated by [facility] [address] [address] 
[phone] Patient: Dora Rod MRN: [id number] :1945 About your hospitalization You were admitted on:  2018 You last received care in the:  [facility] You were discharged on:  2018 Why you were hospitalized Your primary diagnosis was:  Not on File Follow-up Information Follow up With Details Comments Contact Info Aline Gavin MD   [address] [facility] [address] 
[phone] Evelyne Cardozo DO   [address] [facility] [address] [facility] [address] 
[phone] Discharge Orders None A check rian indicates which time of day the medication should be taken. My Medications CONTINUE taking these medications Instructions Each Dose to Equal  
 Morning Noon Evening Bedtime LESCOL XL 80 mg SR tablet Generic drug:  fluvastatin XL Your last dose was: Your next dose is: Take  by mouth nightly. losartan 50 mg tablet Commonly known as:  COZAAR Your last dose was: Your next dose is: Take  by mouth nightly. ZyrTEC 10 mg tablet Generic drug:  cetirizine Your last dose was: Your next dose is: Take  by mouth daily. Discharge Instructions Colonoscopy: What to Expect at [facility] Your Recovery After you have a colonoscopy, you will stay at the clinic for 1 to 2 hours until the medicines wear off. Then you can go home. But you will need to arrange for a ride. Your doctor will tell you when you can eat and do your other usual activities. Your doctor will talk to you about when you will need your next colonoscopy.  Your doctor can help you decide how often you need to be checked. This will depend on the results of your test and your risk for colorectal cancer. After the test, you may be bloated or have gas pains. You may need to pass gas. If a biopsy was done or a polyp was removed, you may have streaks of blood in your stool (feces) for a few days. This care sheet gives you a general idea about how long it will take for you to recover. But each person recovers at a different pace. Follow the steps below to get better as quickly as possible. How can you care for yourself at home? Activity · Rest when you feel tired. · You can do your normal activities when it feels okay to do so. Diet · Follow your doctor's directions for eating. · Unless your doctor has told you not to, drink plenty of fluids. This helps to replace the fluids that were lost during the colon prep. · Do not drink alcohol. Medicines · Your doctor will tell you if and when you can restart your medicines. He or she will also give you instructions about taking any new medicines. · If you take blood thinners, such as warfarin (Coumadin), clopidogrel (Plavix), or aspirin, be sure to talk to your doctor. He or she will tell you if and when to start taking those medicines again. Make sure that you understand exactly what your doctor wants you to do. · If polyps were removed or a biopsy was done during the test, your doctor may tell you not to take aspirin or other anti-inflammatory medicines for a few days. These include ibuprofen (Advil, Motrin) and naproxen (Aleve). Other instructions · For your safety, do not drive or operate machinery until the medicine wears off and you can think clearly. Your doctor may tell you not to drive or operate machinery until the day after your test. 
· Do not sign legal documents or make major decisions until the medicine wears off and you can think clearly. The anesthesia can make it hard for you to fully understand what you are agreeing to. Follow-up care is a key part of your treatment and safety. Be sure to make and go to all appointments, and call your doctor if you are having problems. It's also a good idea to know your test results and keep a list of the medicines you take. When should you call for help? Call 911 anytime you think you may need emergency care. For example, call if: 
· You passed out (lost consciousness). · You pass maroon or bloody stools. · You have severe belly pain. Call your doctor now or seek immediate medical care if: 
· Your stools are black and tarlike. · Your stools have streaks of blood, but you did not have a biopsy or any polyps removed. · You have belly pain, or your belly is swollen and firm. · You vomit. · You have a fever. · You are very dizzy. Watch closely for changes in your health, and be sure to contact your doctor if you have any problems. Where can you learn more? Go to [employer] Enter E264 in the search box to learn more about \"Colonoscopy: What to Expect at Home. \"  
© 1261-3608 Healthwise, Incorporated. Care instructions adapted under license by Romayne Duster (which disclaims liability or warranty for this information). This care instruction is for use with your licensed healthcare professional. If you have questions about a medical condition or this instruction, always ask your healthcare professional. [place] [address] any warranty or liability for your use of this information. Content Version: 15.1.854377; Current as of: November 20, 2015 DISCHARGE SUMMARY from Nurse POST-PROCEDURE INSTRUCTIONS: 
 
Call your Physician if you: 
? Observe any excess bleeding. ? Develop a temperature over 100.5o F. 
? Experience abdominal, shoulder or chest pain. ? Notice any signs of decreased circulation or nerve impairment to an extremity such as a change in color, persistent numbness, tingling, coldness or increase in pain. ? Vomit blood or you have nausea and vomiting lasting longer than 4 hours. ? Are unable to take medications. ? Are unable to urinate within 8 hours after discharge following general anesthesia or intravenous sedation. For the next 24 hours after receiving general anesthesia or intravenous sedation, or while taking prescription Narcotics, limit your activities: 
? Do NOT drive a motor vehicle, operate hazard machinery or power tools, or perform tasks that require coordination. The medication you received during your procedure may have some effect on your mental awareness. ? Do NOT make important personal or business decisions. The medication you received during your procedure may have some effect on your mental awareness. ? Do NOT drink alcoholic beverages. These drinks do not mix well with the medications that have been given to you. ? Upon discharge from the hospital, you must be accompanied by a responsible adult. ? Resume your diet as directed by your physician. ? Resume medications as your physician has prescribed. ? Please give a list of your current medications to your Primary Care Provider. ? Please update this list whenever your medications are discontinued, doses are changed, or new medications (including over-the-counter products) are added. ? Please carry medication information at all times in case of emergency situations. These are general instructions for a healthy lifestyle: No smoking/ No tobacco products/ Avoid exposure to second hand smoke. ? Surgeon General's Warning:  Quitting smoking now greatly reduces serious risk to your health. Obesity, smoking, and a sedentary lifestyle greatly increase your risk for illness. ? A healthy diet, regular physical exercise & weight monitoring are important for maintaining a healthy lifestyle ?  You may be retaining fluid if you have a history of heart failure or if you experience any of the following symptoms:  Weight gain of 3 pounds or more overnight or 5 pounds in a week, increased swelling in our hands or feet or shortness of breath while lying flat in bed. Please call your doctor as soon as you notice any of these symptoms; do not wait until your next office visit. Recognize signs and symptoms of STROKE: 
F  -  Face looks uneven A  -  Arms unable to move or move unevenly S  -  Speech slurred or non-existent T  -  Time to call 911 - as soon as signs and symptoms begin - DO NOT go back to bed or wait to see If you get better - TIME IS BRAIN. Colorectal Screening ? Colorectal cancer almost always develops from precancerous polyps (abnormal growths) in the colon or rectum. Screening tests can find precancerous polyps, so that they can be removed before they turn into cancer. Screening tests can also find colorectal cancer early, when treatment works best. 
? Speak with your physician about when you should begin screening and how often you should be tested. Educational references and/or instructions provided during this visit included: 
 
normal 
 
 
APPOINTMENTS: 
 
Please make a follow-up appointment with your physician. Discharge information has been reviewed with the patient. The patient verbalized understanding. Introducing [facility]! [employer] introduces [employer] patient portal. Now you can access parts of your medical record, email your doctor's office, and request medication refills online. 1. In your internet browser, go to [email or web address] 2. Click on the First Time User? Click Here link in the Sign In box. You will see the New Member Sign Up page. 3. Enter your [employer] Access Code exactly as it appears below. You will not need to use this code after youve completed the sign-up process. If you do not sign up before the expiration date, you must request a new code. · [employer] Access Code: [email or web address] Expires: 9/3/2018 10:55 AM 
 
4. Enter the last four digits of your Social Security Number (xxxx) and Date of Birth (mm/dd/yyyy) as indicated and click Submit. You will be taken to the next sign-up page. 5. Create a [employer] ID. This will be your [employer] login ID and cannot be changed, so think of one that is secure and easy to remember. 6. Create a [employer] password. You can change your password at any time. 7. Enter your Password Reset Question and Answer. This can be used at a later time if you forget your password. 8. Enter your e-mail address. You will receive e-mail notification when new information is available in [address] 9. Click Sign Up. You can now view and download portions of your medical record. 10. Click the Download Summary menu link to download a portable copy of your medical information. If you have questions, please visit the Frequently Asked Questions section of the [employer] website. Remember, [employer] is NOT to be used for urgent needs. For medical emergencies, dial 911. Now available from your iPhone and Android! Introducing Jatinder Horn As a [employer] patient, I wanted to make you aware of our electronic visit tool called Jatinder Lloydimmanuelzulay. [employer] 24/7 allows you to connect within minutes with a medical provider 24 hours a day, seven days a week via a mobile device or tablet or logging into a secure website from your computer. You can access Jatinder Horn from anywhere in the [place]. A virtual visit might be right for you when you have a simple condition and feel like you just dont want to get out of bed, or cant get away from work for an appointment, when your regular [employer] provider is not available (evenings, weekends or holidays), or when youre out of town and need minor care.   Electronic visits cost only $49 and if the [facility] 24/7 provider determines a prescription is needed to treat your condition, one can be electronically transmitted to a nearby pharmacy*. Please take a moment to enroll today if you have not already done so. The enrollment process is free and takes just a few minutes. To enroll, please download the [employer] 24/7 mary to your tablet or phone, or visit [email or web address] to enroll on your computer. And, as an [address] [place] [address] patient with a [employer] account, the results of your visits will be scanned into your electronic medical record and your primary care provider will be able to view the scanned results. We urge you to continue to see your regular [employer] provider for your ongoing medical care. And while your primary care provider may not be the one available when you seek a [email or web address] virtual visit, the peace of mind you get from getting a real diagnosis real time can be priceless. For more information on [email or web address] view our Frequently Asked Questions (FAQs) at [email or web address]. Sincerely, 
 
Aurora Arnett MD 
Chief Medical Officer [facility] Dena Higgins *:  certain medications cannot be prescribed via [email or web address] Providers Seen During Your Hospitalization Provider Specialty Primary office phone Radha Marion MD Gastroenterology [phone] Your Primary Care Physician (PCP) Primary Care Physician Office Phone Office Fax German Sanchez [phone] You are allergic to the following Allergen Reactions Sulfa (Sulfonamide Antibiotics) Other (comments) Constipation Recent Documentation Height Weight BMI OB Status Smoking Status 1.626 m 96.2 kg 36.39 kg/m2 Hysterectomy Never Smoker Emergency Contacts Name Discharge Info Relation Home Work Mobile Geoffrey Haynes DISCHARGE CAREGIVER [3] Son [22] [phone] Ivy,Tammy DISCHARGE CAREGIVER [3] Daughter [21] [phone] Patient Belongings The following personal items are in your possession at time of discharge: 
  Dental Appliances: None  Visual Aid: Glasses Please provide this summary of care documentation to your next provider. Signatures-by signing, you are acknowledging that this After Visit Summary has been reviewed with you and you have received a copy. Patient Signature:  ____________________________________________________________ Date:  ____________________________________________________________  
  
[facility] Provider Signature:  ____________________________________________________________ Date:  ____________________________________________________________

## 2022-08-22 ENCOUNTER — OFFICE VISIT (OUTPATIENT)
Dept: ORTHOPEDIC SURGERY | Age: 77
End: 2022-08-22
Payer: MEDICARE

## 2022-08-22 VITALS
TEMPERATURE: 98 F | SYSTOLIC BLOOD PRESSURE: 114 MMHG | OXYGEN SATURATION: 95 % | BODY MASS INDEX: 36.02 KG/M2 | HEART RATE: 88 BPM | DIASTOLIC BLOOD PRESSURE: 75 MMHG | HEIGHT: 64 IN | WEIGHT: 211 LBS

## 2022-08-22 DIAGNOSIS — M51.26 HNP (HERNIATED NUCLEUS PULPOSUS), LUMBAR: ICD-10-CM

## 2022-08-22 DIAGNOSIS — M54.17 RIGHT LUMBOSACRAL RADICULOPATHY: Primary | ICD-10-CM

## 2022-08-22 PROCEDURE — G8432 DEP SCR NOT DOC, RNG: HCPCS | Performed by: PHYSICAL MEDICINE & REHABILITATION

## 2022-08-22 PROCEDURE — 1123F ACP DISCUSS/DSCN MKR DOCD: CPT | Performed by: PHYSICAL MEDICINE & REHABILITATION

## 2022-08-22 PROCEDURE — G8400 PT W/DXA NO RESULTS DOC: HCPCS | Performed by: PHYSICAL MEDICINE & REHABILITATION

## 2022-08-22 PROCEDURE — 1090F PRES/ABSN URINE INCON ASSESS: CPT | Performed by: PHYSICAL MEDICINE & REHABILITATION

## 2022-08-22 PROCEDURE — 1101F PT FALLS ASSESS-DOCD LE1/YR: CPT | Performed by: PHYSICAL MEDICINE & REHABILITATION

## 2022-08-22 PROCEDURE — 99204 OFFICE O/P NEW MOD 45 MIN: CPT | Performed by: PHYSICAL MEDICINE & REHABILITATION

## 2022-08-22 PROCEDURE — G8427 DOCREV CUR MEDS BY ELIG CLIN: HCPCS | Performed by: PHYSICAL MEDICINE & REHABILITATION

## 2022-08-22 PROCEDURE — G8536 NO DOC ELDER MAL SCRN: HCPCS | Performed by: PHYSICAL MEDICINE & REHABILITATION

## 2022-08-22 PROCEDURE — G8417 CALC BMI ABV UP PARAM F/U: HCPCS | Performed by: PHYSICAL MEDICINE & REHABILITATION

## 2022-08-22 RX ORDER — ZINC GLUCONATE 10 MG
250 LOZENGE ORAL DAILY
COMMUNITY

## 2022-08-22 RX ORDER — CYCLOBENZAPRINE HCL 5 MG
5 TABLET ORAL
Qty: 30 TABLET | Refills: 0 | Status: SHIPPED | OUTPATIENT
Start: 2022-08-22 | End: 2022-09-07

## 2022-08-22 RX ORDER — ALPRAZOLAM 0.25 MG/1
0.25 TABLET ORAL DAILY
COMMUNITY

## 2022-08-22 RX ORDER — HYDROCHLOROTHIAZIDE 25 MG/1
25 TABLET ORAL DAILY
COMMUNITY
Start: 2022-07-19

## 2022-08-22 RX ORDER — TRAMADOL HYDROCHLORIDE 50 MG/1
TABLET ORAL
COMMUNITY
Start: 2022-08-04

## 2022-08-22 RX ORDER — PREGABALIN 25 MG/1
25 CAPSULE ORAL 2 TIMES DAILY
Qty: 60 CAPSULE | Refills: 1 | Status: SHIPPED | OUTPATIENT
Start: 2022-08-22 | End: 2022-08-25 | Stop reason: SINTOL

## 2022-08-22 RX ORDER — AMLODIPINE BESYLATE 2.5 MG/1
2.5 TABLET ORAL DAILY
COMMUNITY
Start: 2022-07-19

## 2022-08-22 RX ORDER — CYCLOBENZAPRINE HCL 5 MG
5 TABLET ORAL 3 TIMES DAILY
COMMUNITY
Start: 2022-07-28 | End: 2022-08-22 | Stop reason: SDUPTHER

## 2022-08-22 RX ORDER — EZETIMIBE 10 MG/1
10 TABLET ORAL DAILY
COMMUNITY
Start: 2022-08-19

## 2022-08-22 RX ORDER — PROPRANOLOL/HYDROCHLOROTHIAZID 40 MG-25MG
500 TABLET ORAL DAILY
COMMUNITY

## 2022-08-22 RX ORDER — FLUTICASONE PROPIONATE 50 MCG
2 SPRAY, SUSPENSION (ML) NASAL
COMMUNITY
Start: 2022-04-12

## 2022-08-22 NOTE — LETTER
8/24/2022    Patient: Erica Moya   YOB: 1945   Date of Visit: 8/22/2022     Teresita Quintana, [address] [address]  Via Fax: [phone]    Dear Teresita Quintana DO,      Thank you for referring Ms. Erica Moya to [facility] for evaluation. My notes for this consultation are attached. If you have questions, please do not hesitate to call me. I look forward to following your patient along with you.       Sincerely,    Emilee Monson MD

## 2022-08-22 NOTE — PROGRESS NOTES
[address]  [place], [address] [place] [address] Street  Phone: [phone]  Fax: [phone]        Jackson Camilo  : 1945  PCP: Hortecnia Marc DO    NEW PATIENT EVALUATION      ASSESSMENT AND PLAN    Diagnoses and all orders for this visit:    1. Right lumbosacral radiculopathy    2. HNP (herniated nucleus pulposus), lumbar, right L5/S1  -     pregabalin (Lyrica) 25 mg capsule; Take 1 Capsule by mouth two (2) times a day. Max Daily Amount: 50 mg.  -     cyclobenzaprine (FLEXERIL) 5 mg tablet; Take 1 Tablet by mouth nightly as needed for Muscle Spasm(s). -     REFERRAL TO PHYSICAL THERAPY       Suzy Colin is a 64 y.o. female with 6 weeks of right lumbosacral radiculopathy due to a disc herniation. Discussed the natural history of her condition as well as treatment options including PT, medications, and injections. She has a cruise planned in about 3 weeks, I highly doubt that she will be back at baseline. Referral to Physical Therapy  Avoid repetitive bending, lifting, and twisting  Trial of Lyrica 75 mg. Take 1 po QHS x one week then increase to 1 po BID thereafter  RF Flexeril  Discussed spine injection if pain does not improve. Given information on HNP      Follow-up and Dispositions    Return in about 2 weeks (around 2022) for ok to add on per Dr. Ana Adkins, medication management, PT fu. HISTORY OF PRESENT ILLNESS  Suzy Colin is seen today in consultation for low back pain x 5-6 weeks. She reports progressively low back pain radiating into her RLE. Pt states the pain started after constantly climbing in and out of a high bed for 5 days while on a family vacation. Her pain is exacerbated with standing. Pt notes a 5-10 minute standing tolerance. She has numbness and tingling in her right calf and foot. Pt describes a burning sensation in her right buttock. Denies insomnia. Denies prior history of sciatica.     Pt has had two rounds of Prednisone with little benefit. Denies side effects. She states Flexeril QHS with some benefit, cannot take during the day due to somnolence. Pt usually takes Tramadol once a day but increased to BID due to elevation in pain. Denies persistent fevers, chills, weight changes, saddle paresthesias, and neurogenic bowel or bladder symptoms. .    Pt [place]. Pain Assessment  8/22/2022   Location of Pain Hip; Leg   Location Modifiers Right   Severity of Pain 2   Quality of Pain Aching;Burning; Other (Comment)   Quality of Pain Comment numbness   Duration of Pain Persistent   Frequency of Pain Constant   Aggravating Factors Other (Comment)   Aggravating Factors Comment stand too long   Limiting Behavior Yes   Relieving Factors Other (Comment)   Relieving Factors Comment tramadol, tylenol, muscle relaxers   Result of Injury No       Onset of pain: 7/2022      Investigations:   L MRI 8/2022: right L5-S1 disc extrusion, multiple degenerative changes  Spine surgery consult: none    Treatments:  Physical therapy: no  Spinal injections: no  Spinal surgery- no  Beneficial medications: Tramadol, Tylenol, Flexeril  Failed medications: MDP x 2    Work Status: retired  Pertinent PMHx:  GERD, HTN, HLD.      Visit Vitals  /75 (BP 1 Location: Right upper arm, BP Patient Position: Sitting, BP Cuff Size: Large adult)   Pulse 88   Temp 98 °F (36.7 °C) (Temporal)   Ht 5' 4\" (1.626 m)   Wt 211 lb (95.7 kg)   SpO2 95% Comment: RA   BMI 36.22 kg/m²       PHYSICAL EXAM    TTP right L5-S1, right sciatic notch, right trochanteric bursa  SLR positive on right  LE strength intact except right DF weakness 4/5        Past Medical History:   Diagnosis Date    GERD (gastroesophageal reflux disease)     High cholesterol     Hypertension         Past Surgical History:   Procedure Laterality Date    COLONOSCOPY N/A 8/16/2018    COLONOSCOPY performed by Tiago Antonio MD at [facility] ENDOSCOPY    HX CHOLECYSTECTOMY      HX COLONOSCOPY      HX HEMORRHOIDECTOMY      HX HYSTERECTOMY           Current Outpatient Medications   Medication Sig Dispense Refill    amLODIPine (NORVASC) 2.5 mg tablet Take 2.5 mg by mouth daily. ezetimibe (ZETIA) 10 mg tablet Take 10 mg by mouth daily. fluticasone propionate (FLONASE) 50 mcg/actuation nasal spray 2 Sprays by Both Nostrils route daily as needed. hydroCHLOROthiazide (HYDRODIURIL) 25 mg tablet Take 25 mg by mouth daily. magnesium 250 mg tab Take 250 mg by mouth daily. traMADoL (ULTRAM) 50 mg tablet TAKE 1 TABLET BY MOUTH EVERY 8 HOURS FOR 7 DAYS AS NEEDED FOR PAIN      turmeric-turmeric root extract 450-50 mg cap Take 500 mg by mouth daily. ALPRAZolam (Xanax) 0.25 mg tablet Take 0.25 mg by mouth daily. pregabalin (Lyrica) 25 mg capsule Take 1 Capsule by mouth two (2) times a day. Max Daily Amount: 50 mg. 60 Capsule 1    cyclobenzaprine (FLEXERIL) 5 mg tablet Take 1 Tablet by mouth nightly as needed for Muscle Spasm(s). 30 Tablet 0    losartan (COZAAR) 50 mg tablet Take 100 mg by mouth nightly. cetirizine (ZYRTEC) 10 mg tablet Take  by mouth daily. eyeglasses

## 2022-08-22 NOTE — PROGRESS NOTES
Gregor Watters presents today for   Chief Complaint   Patient presents with    Hip Pain     right    Leg Pain     right       Is someone accompanying this pt? no    Is the patient using any DME equipment during OV? no      Learning Assessment:  Learning Assessment 8/22/2022   PRIMARY LEARNER Patient   PRIMARY LANGUAGE ENGLISH   LEARNER PREFERENCE PRIMARY READING     DEMONSTRATION   ANSWERED BY patient   RELATIONSHIP SELF       Abuse Screening:  Abuse Screening Questionnaire 8/22/2022   Do you ever feel afraid of your partner? N   Are you in a relationship with someone who physically or mentally threatens you? N   Is it safe for you to go home? Y       Fall Risk  Fall Risk Assessment, last 12 mths 8/22/2022   Able to walk? Yes   Fall in past 12 months? 0   Do you feel unsteady? 0   Are you worried about falling 0     Coordination of Care:  1. Have you been to the ER, urgent care clinic since your last visit? no  Hospitalized since your last visit? no    2. Have you seen or consulted any other health care providers outside of the [address] [place] [address] since your last visit? Yes, pcp Include any pap smears or colon screening.  no

## 2022-08-23 ENCOUNTER — TELEPHONE (OUTPATIENT)
Dept: ORTHOPEDIC SURGERY | Age: 77
End: 2022-08-23

## 2022-08-24 ENCOUNTER — TELEPHONE (OUTPATIENT)
Dept: ORTHOPEDIC SURGERY | Age: 77
End: 2022-08-24

## 2022-08-24 DIAGNOSIS — M51.26 HNP (HERNIATED NUCLEUS PULPOSUS), LUMBAR: Primary | ICD-10-CM

## 2022-08-24 DIAGNOSIS — M54.17 RIGHT LUMBOSACRAL RADICULOPATHY: ICD-10-CM

## 2022-08-24 NOTE — TELEPHONE ENCOUNTER
Patient called requesting for doctor to call her patient stated its concerning her medication.  Patient request  a call on her cell phone @ [phone]

## 2022-08-24 NOTE — TELEPHONE ENCOUNTER
PA has been submitted to cover my meds waiting answer. Called Patient left message to call back office.

## 2022-08-25 RX ORDER — DULOXETIN HYDROCHLORIDE 20 MG/1
20 CAPSULE, DELAYED RELEASE ORAL DAILY
Qty: 30 CAPSULE | Refills: 0 | Status: SHIPPED | OUTPATIENT
Start: 2022-08-25

## 2022-08-25 NOTE — TELEPHONE ENCOUNTER
Patient returned nurses call, and is asking for a call back. Patient states she can be reached at   [phone].

## 2022-08-25 NOTE — TELEPHONE ENCOUNTER
Called patient identified patient by two identifiers. I explain to patient that I submitted the PA for medication and awaiting approval. Patient explain she can not take pregabalin 25mg because it makes her heart race. She does not feel well taking this.  She would like to know what her next steps would be please advise

## 2022-08-26 ENCOUNTER — HOSPITAL ENCOUNTER (OUTPATIENT)
Dept: PHYSICAL THERAPY | Age: 77
Discharge: HOME | End: 2022-08-26
Attending: PHYSICAL MEDICINE & REHABILITATION
Payer: MEDICARE

## 2022-08-26 PROCEDURE — 97140 MANUAL THERAPY 1/> REGIONS: CPT

## 2022-08-26 PROCEDURE — 97162 PT EVAL MOD COMPLEX 30 MIN: CPT

## 2022-08-26 PROCEDURE — 97110 THERAPEUTIC EXERCISES: CPT

## 2022-08-26 NOTE — PROGRESS NOTES
[facility]              [address]        [place], [address] [place]   [phone] fax    Plan of Care/ Statement of Necessity for Physical Therapy Services    Patient name: Ellis Tamayo Start of Care: 2022   Referral source: Evgeny Dunlap MD : 1945    Medical Diagnosis: Other low back pain [M54.59]  Payor: Arlen Medic / Plan: [place] MEDICARE PART A & B / Product Type: Medicare /  Onset Date:2022    Treatment Diagnosis: Right Lumbar Radicular Pain   Prior Hospitalization: see medical history Provider#: [id number]   Medications: Verified on Patient summary List    Comorbidities: HTN, BMI>30, GERD, Anxiety, Arthritis    Prior Level of Function: Retired, Walking for Exercise (3x/day; 20-30 minutes), Gym Regime, Walking without AD, No Fall History, (I) Functional ADLs, (I) Self-Care ADLs, (I) Driving      The Plan of Care and following information is based on the information from the initial evaluation. Assessment:    Patient presents with s/s consistent with right lumbar radicular pain and right hip myofascial pain with atraumatic onset 2022. Objectively patient noted to demonstrate full and symmetrical myotomal strength but noted to demonstrate a (+) right supine SLR with non-specific right LE hyposensitivity in a non-dermatomal pattern. With right trendelenberg gait pattern with patient advised regarding the potential benefit of use of SPC to improve ambulation tolerance with patient with upcoming vacation 9/15/. To emphasize improvement sin right posterior hip myofascial restrictions and right LE neurodynamic mobility to optimize function, reduce falls risk and improve ease with return to PLOF and activity.       Patient will continue to benefit from skilled PT services to modify and progress therapeutic interventions, address functional mobility deficits, address ROM deficits, address strength deficits, analyze and address soft tissue restrictions, analyze and cue movement patterns, analyze and modify body mechanics/ergonomics, assess and modify postural abnormalities, address imbalance/dizziness, and instruct in home and community integration to attain remaining goals.     Key Information:    BP: 152/90 mmHg  Posture: Symmetrical iliac crest height with symmetrical weightbearing bilaterally, No observable lateral shift     Gait:    Without AD; Right antalgic gait pattern with right trendelenberg     Functional Tests:  SLS: Left SLS < 5 sec / Right SLS < 5 sec (right ankle pain)     Active Movements: [] N/A   [] Too acute   [] Other:  ROM % AROM Comments   Forward flexion 40-60 10% limited Increase in right LE N/T    Extension 20-30 25% limited Pull right posterior calf   SB right 20-30 50% limited Central lumbar pain   SB left 20-30 50% limited Right posterior buttock pain, Calf tightness      Neuro Screen [] WNL  Dermatome: Diminished sensation right L2-L4  Reflexes: 2+ L Patellar, 0+ R Patellar, 0+ L/R Achilles     Dural Mobility:  SLR Supine: (+) Right (+ ankle differentiation)     Palpation: Non-specific TTP to right gluteal region, No TTP L1-L5 SP     Joint Mobility: Hypomobile right hip posterior capsule     LE MMT      Left Right   Hip Flexion 5 5     Abduction NT  4     ER 5 5     IR 5 5   Knee Flexion 5 4+     Extension 5 5   Ankle Dorsiflexion 5 5     Hallux Ext 5 5     Ankle Eversion 5 5   *Assessed in supine     Special Tests       Hip:            Sharlie Pila:              [x] R    [] L    [x] +    [] -                           FADDIR:          [x] R    [] L    [x] +    [] -      Evaluation Complexity History MEDIUM  Complexity : 1-2 comorbidities / personal factors will impact the outcome/ POC ; Examination MEDIUM Complexity : 3 Standardized tests and measures addressing body structure, function, activity limitation and / or participation in recreation  ;Presentation MEDIUM Complexity : Evolving with changing characteristics ;Clinical Decision Making MEDIUM Complexity : FOTO score of 26-74  Overall Complexity Rating: MEDIUM  Problem List: pain affecting function, decrease ROM, decrease strength, impaired gait/ balance, decrease ADL/ functional abilitiies, decrease activity tolerance, decrease flexibility/ joint mobility, and decrease transfer abilities   Treatment Plan may include any combination of the following: Therapeutic exercise, Therapeutic activities, Neuromuscular re-education, Physical agent/modality, Gait/balance training, Manual therapy, Patient education, Self Care training, Functional mobility training, Home safety training, and Stair training  Patient / Family readiness to learn indicated by: asking questions, trying to perform skills, and interest  Persons(s) to be included in education: patient (P)  Barriers to Learning/Limitations: None  Patient Goal (s): Numbness to go away  Patient Self Reported Health Status: good  Rehabilitation Potential: good    Short Term Goals: To be accomplished in 2 weeks:  1. Patient will subjectively report full compliance with prescribed HEP. Eval: HEP provided  2. Patient will demonstrate right hip abduction MMT 5/5 to improve safety with ambulation on uneven surfaces. Eval: Right Hip Abduction MMT = 4/5  3. Patient will demonstrate right SLS >/= 20 seconds without pain to improve ease with stair management. Eval: Right SLS < 5 sec (right calf/ankle pain)    Long Term Goals: To be accomplished in 4 weeks:  1. Patient will demonstrate a significant functional improvement as demonstrated by a score of >/= 62 on FOTO. Eval: FOTO = 47       2. Patient will subjectively report ambulation tolerance >/= 30 minutes to enable return back to walking regime for exercise. Eval: Ambulation Tolerance = 5-10 minutes  3. Patient will demonstrate (-) right supine SLR to improve ease with donning shoewear.   Eval: (+) Right Supine SLR    Frequency / Duration: Patient to be seen 2 times per week for 4 weeks. Patient/ Caregiver education and instruction: Diagnosis, prognosis, self care, activity modification, and exercises   [x]  Plan of care has been reviewed with PTA      Certification Period: 8/26//2022 - 9/24/2022  Forrest Lee, PT 8/26/2022 3:26 PM  ________________________________________________________________________    I certify that the above Therapy Services are being furnished while the patient is under my care. I agree with the treatment plan and certify that this therapy is necessary.     [address] [place] [address] Signature:____________Date:_________TIME:________     Katelynn Nathan MD  ** Signature, Date and Time must be completed for valid certification **  Please sign and return to [facility] - [address], [address] [place]   [phone] fax

## 2022-08-26 NOTE — PROGRESS NOTES
PT DAILY TREATMENT NOTE     Patient Name: Kaylan Price  Date:2022  : 1945  [x]  Patient  Verified  Payor: Dyana Tarango / Plan: [place] MEDICARE PART A & B / Product Type: Medicare /    In time:205  Out time:245  Total Treatment Time (min): 40  Visit #: 1 of 8    Medicare/BCBS Only   Total Timed Codes (min):  23 1:1 Treatment Time:  40       Treatment Area: Other low back pain [M54.59]    Physical Therapy Evaluation - Lumbar    SUBJECTIVE      Any medication changes, allergies to medications, adverse drug reactions, diagnosis change, or new procedure performed?: [x] No    [] Yes (see summary sheet for update)    Subjective functional status/changes:     PLOF: Retired, Walking for Exercise (3x/day; 20-30 minutes), Gym Regime, Walking without AD, No Fall History, (I) Functional ADLs, (I) Self-Care ADLs, (I) Driving  Current Functional Status: Walking Regime (10 min, 3x/week), Difficulty with self-care ADLs, Difficulty with household ADLs,   Work Hx: Retired  Living Situation: [facility] in [facility]  Comorbidities: HTN, BMI>30, GERD, Anxiety, Arthritis   Medications: Tylenol, Muscle Relaxer    Subjective: Patient presents with progressive low back pain with right LE radicular pain. Chato reports self-perceived correlation of symptoms to getting into/out of a high bed while on vacation for 5 days (2022). Celeste Leary reports with attempt to get up onto a high bed without a footstool. Patient denies specific injury. Patient reports increase in pain with standing tolerance 5-10 minutes. Patient as well endorses N/T in her right calf and foot and a burning sensation in her right buttock. Patient denies prior history of sciatica. Patient denies prior history of lumbar nor hip surgeries. Patient denies fever/chills, saddle paresthesias, changes in bowel/bladder symptoms. Patient reports that she will be leaving to go on a cruise to [facility]) 9/15/2022.  MRI completed 2022 demonstrated right L5-S1 disc extrusion. Patient reports that N/T is intermittent with patient reporting instability but denies foot drop. Patient denies use of AD. Patient denies sleep disturbances associated with pain. Pain Intensity (0-10, VAS): Current 3, Worst 10, Best 3    Patient Goals: \"Numbness to go away. \"    OBJECTIVE EXAMINATION    BP: 152/90 mmHg  Posture: Symmetrical iliac crest height with symmetrical weightbearing bilaterally, No observable lateral shift    Gait: Without AD; Right antalgic gait pattern with right trendelenberg    Functional Tests:  SLS: Left SLS < 5 sec / Right SLS < 5 sec (right ankle pain)    Active Movements: [] N/A   [] Too acute   [] Other:  ROM % AROM Comments   Forward flexion 40-60 10% limited Increase in right LE N/T    Extension 20-30 25% limited Pull right posterior calf   SB right 20-30 50% limited Central lumbar pain   SB left 20-30 50% limited Right posterior buttock pain, Calf tightness     Neuro Screen [] WNL  Dermatome: Diminished sensation right L2-L4  Reflexes: 2+ L Patellar, 0+ R Patellar, 0+ L/R Achilles    Dural Mobility:  SLR Supine: (+) Right (+ ankle differentiation)    Palpation: Non-specific TTP to right gluteal region, No TTP L1-L5 SP    Joint Mobility: Hypomobile right hip posterior capsule    LE MMT    Left Right   Hip Flexion 5 5    Extension      Abduction  4    ER 5 5    IR 5 5   Knee Flexion 5 4+    Extension 5 5   Ankle Dorsiflexion 5 5    Hallux Ext 5 5    Ankle Eversion 5 5   *Assessed in supine    Special Tests       Hip: Adrian Fare:  [x] R    [] L    [x] +    [] -     FADDIR: [x] R    [] L    [x] +    [] -       OBJECTIVE    17 min [x]Eval                  []Re-Eval     10 min Therapeutic Exercise:  [x] See flow sheet : Patient educated regarding completion of prescribed HEP and provided with written HEP instructions, Patient educated regarding diagnosis and PT POC   Rationale: increase ROM and increase strength to improve the patients ability to improve ease with functional ADLs    5 min Therapeutic Activity:  [x]  See flow sheet : Education re: potential benefit of use of SPC with ambulation to improve ambulation tolerance   Rationale: increase ROM and increase strength  to improve the patients ability to improve weightbearing tolerance     8 min Manual Therapy:    Supine, Right Hip AP Grade II-IV Mobilization (45-90 deg flexion)  Supine, Right Hip Passive Physiological Grade II-III Mobilization - Flex   The manual therapy interventions were performed at a separate and distinct time from the therapeutic activities interventions. Rationale: decrease pain, increase ROM, and increase tissue extensibility to improve ease with ambulation    With   [] TE   [] TA   [] neuro   [] other: Patient Education: [x] Review HEP    [] Progressed/Changed HEP based on:   [] positioning   [] body mechanics   [] transfers   [] heat/ice application    [] other:      Other Objective/Functional Measures:See objective above. Pain Level (0-10 scale) post treatment: 3    ASSESSMENT/Changes in Function: Patient presents with s/s consistent with right lumbar radicular pain and right hip myofascial pain with atraumatic onset 7/8/2022. Objectively patient noted to demonstrate full and symmetrical myotomal strength but noted to demonstrate a (+) right supine SLR with non-specific right LE hyposensitivity in a non-dermatomal pattern. With right trendelenberg gait pattern with patient advised regarding the potential benefit of use of SPC to improve ambulation tolerance with patient with upcoming vacation 9/15/22-9/24/2022. To emphasize improvement sin right posterior hip myofascial restrictions and right LE neurodynamic mobility to optimize function, reduce falls risk and improve ease with return to PLOF and activity.      Patient will continue to benefit from skilled PT services to modify and progress therapeutic interventions, address functional mobility deficits, address ROM deficits, address strength deficits, analyze and address soft tissue restrictions, analyze and cue movement patterns, analyze and modify body mechanics/ergonomics, assess and modify postural abnormalities, address imbalance/dizziness, and instruct in home and community integration to attain remaining goals. [x]  See Plan of Care  []  See progress note/recertification  []  See Discharge Summary         Progress towards goals / Updated goals:    Short Term Goals: To be accomplished in 2 weeks:  1. Patient will subjectively report full compliance with prescribed HEP. Eval: HEP provided  2. Patient will demonstrate right hip abduction MMT 5/5 to improve safety with ambulation on uneven surfaces. Eval: Right Hip Abduction MMT = 4/5  3. Patient will demonstrate right SLS >/= 20 seconds without pain to improve ease with stair management. Eval: Right SLS < 5 sec (right calf/ankle pain)    Long Term Goals: To be accomplished in 4 weeks:  1. Patient will demonstrate a significant functional improvement as demonstrated by a score of >/= 62 on FOTO. Eval: FOTO = 47       2. Patient will subjectively report ambulation tolerance >/= 30 minutes to enable return back to walking regime for exercise. Eval: Ambulation Tolerance = 5-10 minutes  3. Patient will demonstrate (-) right supine SLR to improve ease with donning shoewear.   Eval: (+) Right Supine SLR           PLAN  [x]  Upgrade activities as tolerated     []  Continue plan of care  []  Update interventions per flow sheet       []  Discharge due to:_  []  Other:_      Roxanne Cline, PT 8/26/2022  1:26 PM    Future Appointments   Date Time Provider Rosamaria Ross   8/26/2022  2:00 PM [facility]   9/7/2022  1:00 PM Catrina Dubose MD [facility]

## 2022-08-29 ENCOUNTER — HOSPITAL ENCOUNTER (OUTPATIENT)
Dept: PHYSICAL THERAPY | Age: 77
Discharge: HOME | End: 2022-08-29
Attending: PHYSICAL MEDICINE & REHABILITATION
Payer: MEDICARE

## 2022-08-29 PROCEDURE — 97110 THERAPEUTIC EXERCISES: CPT | Performed by: PHYSICAL THERAPIST

## 2022-08-29 PROCEDURE — 97112 NEUROMUSCULAR REEDUCATION: CPT | Performed by: PHYSICAL THERAPIST

## 2022-08-29 PROCEDURE — 97140 MANUAL THERAPY 1/> REGIONS: CPT | Performed by: PHYSICAL THERAPIST

## 2022-08-29 NOTE — TELEPHONE ENCOUNTER
Called patient verified patient with two identifiers. Explain to patient  to D/c the lyrica. Going to start cymbalta 20  mg daily. The prescription was sent to her pharmacy. The patient acknowledge full understanding. Explained any other questions or concerns please call the office.

## 2022-08-29 NOTE — PROGRESS NOTES
PT DAILY TREATMENT NOTE     Patient Name: Leonard Walker  Date:2022  : 1945  [x]  Patient  Verified  Payor: Rigoberto Singh / Plan: [place] MEDICARE PART A & B / Product Type: Medicare /    In time:1:11  Out time:1:58  Total Treatment Time (min): 47  Visit #: 2 of 8    Medicare/BCBS Only   Total Timed Codes (min):  47 1:1 Treatment Time:  47       Treatment Area: Other low back pain [M54.59]    SUBJECTIVE  Pain Level (0-10 scale): 2-3  Any medication changes, allergies to medications, adverse drug reactions, diagnosis change, or new procedure performed?: [x] No    [] Yes (see summary sheet for update)  Subjective functional status/changes:   [] No changes reported  Patient states she has been compliant with her exercises but found she has to do them on her bed as she cannot get up and down from the floor. OBJECTIVE    23 min Therapeutic Exercise:  [] See flow sheet :   Rationale: increase ROM and increase strength to improve the patients ability to increase her functional activity level. 16 min Neuromuscular Re-education:  []  See flow sheet :   Rationale: increase strength, improve coordination, and increase proprioception  to improve the patients ability to increase core strength to improve lumbar stability. 8 min Manual Therapy:   right hip LAD; right hip AP glides in supine, grade III, IV. Right hip inferior glides with hip at 90 deg, grade II-III. The manual therapy interventions were performed at a separate and distinct time from the therapeutic activities interventions. Rationale: decrease pain, increase ROM, and increase tissue extensibility to increase ease of motion to improve function.             With   [] TE   [] TA   [] neuro   [] other: Patient Education: [x] Review HEP    [] Progressed/Changed HEP based on:   [] positioning   [] body mechanics   [] transfers   [] heat/ice application    [] other:      Other Objective/Functional Measures:  Initiated her gym program, she was able to complete all of her TE without difficulty. Pain Level (0-10 scale) post treatment: 0    ASSESSMENT/Changes in Function: Patient notes decreased c/o pain. States pain mostly with ambulation. Patient will continue to benefit from skilled PT services to modify and progress therapeutic interventions, address functional mobility deficits, address ROM deficits, address strength deficits, analyze and address soft tissue restrictions, analyze and cue movement patterns, analyze and modify body mechanics/ergonomics, assess and modify postural abnormalities, address imbalance/dizziness, and instruct in home and community integration to attain remaining goals. [x]  See Plan of Care  []  See progress note/recertification  []  See Discharge Summary         Progress towards goals / Updated goals:  Short Term Goals: To be accomplished in 2 weeks:  1. Patient will subjectively report full compliance with prescribed HEP. Eval: HEP provided  Current:  Reports daily compliance with her HEP.  8/29/2022. Goal Met.  2. Patient will demonstrate right hip abduction MMT 5/5 to improve safety with ambulation on uneven surfaces. Eval: Right Hip Abduction MMT = 4/5  3. Patient will demonstrate right SLS >/= 20 seconds without pain to improve ease with stair management. Eval: Right SLS < 5 sec (right calf/ankle pain)     Long Term Goals: To be accomplished in 4 weeks:  1. Patient will demonstrate a significant functional improvement as demonstrated by a score of >/= 62 on FOTO. Eval: FOTO = 47       2. Patient will subjectively report ambulation tolerance >/= 30 minutes to enable return back to walking regime for exercise. Eval: Ambulation Tolerance = 5-10 minutes  3. Patient will demonstrate (-) right supine SLR to improve ease with donning shoewear.   Eval: (+) Right Supine SLR    PLAN  [x]  Upgrade activities as tolerated     [x]  Continue plan of care  []  Update interventions per flow sheet       []  Discharge due to:_  []  Other:_      Shreyas Fonseca, PT 8/29/2022  12:07 PM    Future Appointments   Date Time Provider Rosamaria Ross   8/29/2022  1:15 PM Luiza Galeano, PT [facility]   8/31/2022 10:15 AM Luiza Galeano, PT [facility]   9/7/2022  8:45 AM Rishi Christy PTA [facility]   9/7/2022  1:00 PM Zhanna Medina MD [facility]   9/9/2022  3:30 PM Mahtomedi Res,  [facility]   9/12/2022  1:15 PM Mahtomedi Res, [place] [facility]   9/13/2022  2:45 PM Mahtomedi Res, [place] [facility]   9/26/2022  1:15 PM Mahtomedi Res, [place] [facility]   9/28/2022  2:00 PM Micki Voss [facility]   10/3/2022  2:00 PM Delice Aschoff, PTA [facility]

## 2022-08-31 ENCOUNTER — HOSPITAL ENCOUNTER (OUTPATIENT)
Dept: PHYSICAL THERAPY | Age: 77
Discharge: HOME | End: 2022-08-31
Attending: PHYSICAL MEDICINE & REHABILITATION
Payer: MEDICARE

## 2022-08-31 PROCEDURE — 97110 THERAPEUTIC EXERCISES: CPT | Performed by: PHYSICAL THERAPIST

## 2022-08-31 PROCEDURE — 97112 NEUROMUSCULAR REEDUCATION: CPT | Performed by: PHYSICAL THERAPIST

## 2022-08-31 NOTE — PROGRESS NOTES
PT DAILY TREATMENT NOTE     Patient Name: Kamari Hawthorne  Date:2022  : 1945  [x]  Patient  Verified  Payor: Javier Louise / Plan: [place] & B / Product Type: Medicare /    In time:10:16  Out time:11:05  Total Treatment Time (min): 49  Visit #: 3 of 8    Medicare/BCBS Only   Total Timed Codes (min):  39 1:1 Treatment Time:  39       Treatment Area: Other low back pain [M54.59]    SUBJECTIVE  Pain Level (0-10 scale): 4  Any medication changes, allergies to medications, adverse drug reactions, diagnosis change, or new procedure performed?: [x] No    [] Yes (see summary sheet for update)  Subjective functional status/changes:   [] No changes reported  Patient continues to note right hip pain and also notes some weakness affecting her ability to perform her TE well. OBJECTIVE    Modality rationale: decrease edema, decrease inflammation, and decrease pain to improve the patients ability to increase tolerance to activity.    Min Type Additional Details    [] Estim:  []Unatt       []IFC  []Premod                        []Other:  []w/ice   []w/heat  Position:  Location:    [] Estim: []Att    []TENS instruct  []NMES                    []Other:  []w/US   []w/ice   []w/heat  Position:  Location:    []  Traction: [] Cervical       []Lumbar                       [] Prone          []Supine                       []Intermittent   []Continuous Lbs:  [] before manual  [] after manual    []  Ultrasound: []Continuous   [] Pulsed                           []1MHz   []3MHz W/cm2:  Location:    []  Iontophoresis with dexamethasone         Location: [] Take home patch   [] In clinic   10 [x]  Ice     []  heat  []  Ice massage  []  Laser   []  Anodyne Position:left side lying  Location: right hip    []  Laser with stim  []  Other:  Position:  Location:    []  Vasopneumatic Device    []  Right     []  Left  Pre-treatment girth:  Post-treatment girth:  Measured at (location):  Pressure:       [] lo [] med [] hi Temperature: [] lo [] med [] hi   [] Skin assessment post-treatment:  []intact []redness- no adverse reaction    []redness - adverse reaction:     23 min Therapeutic Exercise:  [] See flow sheet :   Rationale: increase ROM and increase strength to improve the patients ability to increase her functional activity level. 16 min Neuromuscular Re-education:  []  See flow sheet :   Rationale: increase strength, improve coordination, and increase proprioception  to improve the patients ability to increase core strength to improve lumbar stability. With   [] TE   [] TA   [] neuro   [] other: Patient Education: [x] Review HEP    [] Progressed/Changed HEP based on:   [] positioning   [] body mechanics   [] transfers   [] heat/ice application    [] other:      Other Objective/Functional Measures:  Gait is without deviation. She exhibits tightness with her TE.  VC needed for proper form. Pain Level (0-10 scale) post treatment: 0    ASSESSMENT/Changes in Function: Patient with decreased pain post TE. Patient will continue to benefit from skilled PT services to modify and progress therapeutic interventions, address functional mobility deficits, address ROM deficits, address strength deficits, analyze and address soft tissue restrictions, analyze and cue movement patterns, analyze and modify body mechanics/ergonomics, assess and modify postural abnormalities, address imbalance/dizziness, and instruct in home and community integration to attain remaining goals. [x]  See Plan of Care  []  See progress note/recertification  []  See Discharge Summary         Progress towards goals / Updated goals:  Short Term Goals: To be accomplished in 2 weeks:  1. Patient will subjectively report full compliance with prescribed HEP. Eval: HEP provided  Current:  Reports daily compliance with her HEP.  8/29/2022.   Goal Met.  2. Patient will demonstrate right hip abduction MMT 5/5 to improve safety with ambulation on uneven surfaces. Eval: Right Hip Abduction MMT = 4/5  3. Patient will demonstrate right SLS >/= 20 seconds without pain to improve ease with stair management. Eval: Right SLS < 5 sec (right calf/ankle pain)     Long Term Goals: To be accomplished in 4 weeks:  1. Patient will demonstrate a significant functional improvement as demonstrated by a score of >/= 62 on FOTO. Eval: FOTO = 47       2. Patient will subjectively report ambulation tolerance >/= 30 minutes to enable return back to walking regime for exercise. Eval: Ambulation Tolerance = 5-10 minutes  3. Patient will demonstrate (-) right supine SLR to improve ease with donning shoewear.   Eval: (+) Right Supine SLR    PLAN  [x]  Upgrade activities as tolerated     [x]  Continue plan of care  []  Update interventions per flow sheet       []  Discharge due to:_  []  Other:_      Denton Srinivasan, PT 8/31/2022  10:48 AM    Future Appointments   Date Time Provider Rosamaria Pappasi   9/7/2022  8:45 AM Dominga Marte PTA [facility]   9/7/2022  1:00 PM Rolando Ribeiro MD [facility] AM[facility]   9/9/2022  3:30 PM Angelita Linear, PT [facility]   9/12/2022  1:15 PM Angelita Linear, PT [facility]   9/13/2022  2:45 PM Angelita Linear, PT [facility]   9/26/2022  1:15 PM Angelita Linear, PT [facility]   9/28/2022  2:00 PM Taz [facility], PTA [facility]   10/3/2022  [facility]

## 2022-09-01 ENCOUNTER — HOSPITAL ENCOUNTER (OUTPATIENT)
Dept: PHYSICAL THERAPY | Age: 77
End: 2022-09-01
Attending: PHYSICAL MEDICINE & REHABILITATION
Payer: MEDICARE

## 2022-09-07 ENCOUNTER — HOSPITAL ENCOUNTER (OUTPATIENT)
Dept: PHYSICAL THERAPY | Age: 77
Discharge: HOME | End: 2022-09-07
Attending: PHYSICAL MEDICINE & REHABILITATION
Payer: MEDICARE

## 2022-09-07 ENCOUNTER — OFFICE VISIT (OUTPATIENT)
Dept: ORTHOPEDIC SURGERY | Age: 77
End: 2022-09-07
Payer: MEDICARE

## 2022-09-07 ENCOUNTER — APPOINTMENT (OUTPATIENT)
Dept: PHYSICAL THERAPY | Age: 77
End: 2022-09-07
Attending: PHYSICAL MEDICINE & REHABILITATION
Payer: MEDICARE

## 2022-09-07 VITALS
HEART RATE: 78 BPM | HEIGHT: 64 IN | WEIGHT: 210 LBS | TEMPERATURE: 98.5 F | SYSTOLIC BLOOD PRESSURE: 138 MMHG | OXYGEN SATURATION: 97 % | DIASTOLIC BLOOD PRESSURE: 71 MMHG | BODY MASS INDEX: 35.85 KG/M2

## 2022-09-07 DIAGNOSIS — M51.26 HNP (HERNIATED NUCLEUS PULPOSUS), LUMBAR: Primary | ICD-10-CM

## 2022-09-07 PROCEDURE — G8400 PT W/DXA NO RESULTS DOC: HCPCS | Performed by: PHYSICAL MEDICINE & REHABILITATION

## 2022-09-07 PROCEDURE — 1123F ACP DISCUSS/DSCN MKR DOCD: CPT | Performed by: PHYSICAL MEDICINE & REHABILITATION

## 2022-09-07 PROCEDURE — G8536 NO DOC ELDER MAL SCRN: HCPCS | Performed by: PHYSICAL MEDICINE & REHABILITATION

## 2022-09-07 PROCEDURE — 1101F PT FALLS ASSESS-DOCD LE1/YR: CPT | Performed by: PHYSICAL MEDICINE & REHABILITATION

## 2022-09-07 PROCEDURE — 1090F PRES/ABSN URINE INCON ASSESS: CPT | Performed by: PHYSICAL MEDICINE & REHABILITATION

## 2022-09-07 PROCEDURE — 99213 OFFICE O/P EST LOW 20 MIN: CPT | Performed by: PHYSICAL MEDICINE & REHABILITATION

## 2022-09-07 PROCEDURE — G8427 DOCREV CUR MEDS BY ELIG CLIN: HCPCS | Performed by: PHYSICAL MEDICINE & REHABILITATION

## 2022-09-07 PROCEDURE — 97112 NEUROMUSCULAR REEDUCATION: CPT

## 2022-09-07 PROCEDURE — G8417 CALC BMI ABV UP PARAM F/U: HCPCS | Performed by: PHYSICAL MEDICINE & REHABILITATION

## 2022-09-07 PROCEDURE — G8432 DEP SCR NOT DOC, RNG: HCPCS | Performed by: PHYSICAL MEDICINE & REHABILITATION

## 2022-09-07 PROCEDURE — 97110 THERAPEUTIC EXERCISES: CPT

## 2022-09-07 RX ORDER — TRAMADOL HYDROCHLORIDE 50 MG/1
50 TABLET ORAL
Qty: 21 TABLET | Refills: 0 | Status: SHIPPED | OUTPATIENT
Start: 2022-09-07 | End: 2022-09-14

## 2022-09-07 NOTE — PROGRESS NOTES
Erica Griffin presents today for   Chief Complaint   Patient presents with    Hip Pain     right       Is someone accompanying this pt? no    Is the patient using any DME equipment during OV? no    Learning Assessment:  Learning Assessment 8/22/2022   PRIMARY LEARNER Patient   PRIMARY LANGUAGE ENGLISH   LEARNER PREFERENCE PRIMARY READING     DEMONSTRATION   ANSWERED BY patient   RELATIONSHIP SELF       Abuse Screening:  Abuse Screening Questionnaire 8/22/2022   Do you ever feel afraid of your partner? N   Are you in a relationship with someone who physically or mentally threatens you? N   Is it safe for you to go home? Y       Fall Risk  Fall Risk Assessment, last 12 mths 8/22/2022   Able to walk? Yes   Fall in past 12 months? 0   Do you feel unsteady? 0   Are you worried about falling 0         Coordination of Care:  1. Have you been to the ER, urgent care clinic since your last visit? no  Hospitalized since your last visit? no    2. Have you seen or consulted any other health care providers outside of the [address] [place] [address] since your last visit? Yes, physical therapy Include any pap smears or colon screening.  no

## 2022-09-07 NOTE — LETTER
9/8/2022    Patient: Tawanna Peck   YOB: 1945   Date of Visit: 9/7/2022     [phone] [facility] [phone]  Via Fax: [phone]    Dear Delmar Manzanares DO,      Thank you for referring Ms. Tawanna Peck to [address] [place] [address] [facility] for evaluation. My notes for this consultation are attached. If you have questions, please do not hesitate to call me. I look forward to following your patient along with you.       Sincerely,    Pato Davis MD

## 2022-09-07 NOTE — PROGRESS NOTES
PT DAILY TREATMENT NOTE     Patient Name: Gilbert Hess  Date:2022  : 1945  [x]  Patient  Verified  Payor: Susan Hazard / Plan: [place] MEDICARE PART A & B / Product Type: Medicare /    In time:840  Out time:930  Total Treatment Time (min): 50  Visit #: 4 of 8    Medicare/BCBS Only   Total Timed Codes (min):  40 1:1 Treatment Time:  40       Treatment Area: Other low back pain [M54.59]    SUBJECTIVE  Pain Level (0-10 scale): 3-4  Any medication changes, allergies to medications, adverse drug reactions, diagnosis change, or new procedure performed?: [x] No    [] Yes (see summary sheet for update)  Subjective functional status/changes:   [] No changes reported  Patient reported no pain but burning in right hip upon arrival    OBJECTIVE         Modality rationale: decrease edema, decrease inflammation, and decrease pain to improve the patients ability to increase tolerance to activity.     Min Type Additional Details     [] Estim:  []Unatt       []IFC  []Premod                        []Other:  []w/ice   []w/heat  Position:  Location:     [] Estim: []Att    []TENS instruct  []NMES                    []Other:  []w/US   []w/ice   []w/heat  Position:  Location:     []  Traction: [] Cervical       []Lumbar                       [] Prone          []Supine                       []Intermittent   []Continuous Lbs:  [] before manual  [] after manual     []  Ultrasound: []Continuous   [] Pulsed                           []1MHz   []3MHz W/cm2:  Location:     []  Iontophoresis with dexamethasone         Location: [] Take home patch   [] In clinic   10 [x]  Ice     []  heat  []  Ice massage  []  Laser   []  Anodyne Position:left side lying  Location: right hip     []  Laser with stim  []  Other:  Position:  Location:     []  Vasopneumatic Device    []  Right     []  Left  Pre-treatment girth:  Post-treatment girth:  Measured at (location):  Pressure:       [] lo [] med [] hi   Temperature: [] lo [] med [] hi   [] Skin assessment post-treatment:  []intact []redness- no adverse reaction    []redness - adverse reaction:       30 min Therapeutic Exercise:  [] See flow sheet :   Rationale: increase ROM and increase strength to improve the patients ability to perform ADLs    10 min Neuromuscular Re-education:  []  See flow sheet :   Rationale: improve coordination and increase proprioception  to improve the patients ability to perform ADLs              With   [] TE   [] TA   [] neuro   [] other: Patient Education: [x] Review HEP    [] Progressed/Changed HEP based on:   [] positioning   [] body mechanics   [] transfers   [] heat/ice application    [] other:      Other Objective/Functional Measures:   - increased reps per flow sheet     Pain Level (0-10 scale) post treatment: 0    ASSESSMENT/Changes in Function: progressed therex per flow sheet with good tolerance and no increase in pain. Patient will continue to benefit from skilled PT services to modify and progress therapeutic interventions, address functional mobility deficits, address ROM deficits, address strength deficits, analyze and address soft tissue restrictions, and analyze and cue movement patterns to attain remaining goals. []  See Plan of Care  []  See progress note/recertification  []  See Discharge Summary         Progress towards goals / Updated goals:  Short Term Goals: To be accomplished in 2 weeks:  1. Patient will subjectively report full compliance with prescribed HEP. Eval: HEP provided  Current:  Reports daily compliance with her HEP.  8/29/2022. Goal Met.  2. Patient will demonstrate right hip abduction MMT 5/5 to improve safety with ambulation on uneven surfaces. Eval: Right Hip Abduction MMT = 4/5  3. Patient will demonstrate right SLS >/= 20 seconds without pain to improve ease with stair management. Eval: Right SLS < 5 sec (right calf/ankle pain)     Long Term Goals: To be accomplished in 4 weeks:  1.  Patient will demonstrate a significant functional improvement as demonstrated by a score of >/= 62 on FOTO. Eval: FOTO = 47       2. Patient will subjectively report ambulation tolerance >/= 30 minutes to enable return back to walking regime for exercise. Eval: Ambulation Tolerance = 5-10 minutes  3. Patient will demonstrate (-) right supine SLR to improve ease with donning shoewear.   Eval: (+) Right Supine SLR    PLAN  []  Upgrade activities as tolerated     []  Continue plan of care  []  Update interventions per flow sheet       []  Discharge due to:_  []  Other:_      Kristen Ramsey PTA 9/7/2022  8:37 AM    Future Appointments   Date Time Provider Rosamaria Vandana   9/7/2022  8:45 AM Mitchell Frias PTA [facility] [address]   9/7/2022  1:00 PM Jake Carson MD [facility] AM[facility]   9/9/2022  3:30 PM Terrence Cherry, PT [facility] [address]   9/12/2022  1:15 PM Terrence Cherry, PT [facility] [address]   9/13/2022  2:45 PM Chel Minor, PT [facility] [address]   9/26/2022  1:15 PM Terrence Cherry, PT [facility] [address]   9/28/2022  2:00 PM Reva Billings PTA [facility] [address]   10/3/2022  2:00 PM Reva Billings, PTA [facility] [address]

## 2022-09-07 NOTE — PROGRESS NOTES
Joseph Espinosa [facility] [[email or web address]] [address], [address]  [facility], [address]  Phone: [phone]  Fax: [phone]        Cheli Mendoza  : 1945  PCP: Berenice Atkins DO    PROGRESS NOTE      ASSESSMENT AND PLAN    Diagnoses and all orders for this visit:    1. HNP (herniated nucleus pulposus), lumbar  -     traMADoL (Ultram) 50 mg tablet; Take 1 Tablet by mouth every eight (8) hours as needed for Pain for up to 7 days. Max Daily Amount: 150 mg.      Anup Palomino is a 64 y.o. female with resolving symptoms referable to a right L5-S1 disc herniation. .   Acute pain rx of Tramadol  Continue PRN Tylenol  Continue PT  Advised to continue HEP as tolerated. Fall precautions, she still has some mild to minimal eversion weakness. Follow-up and Dispositions    Return if symptoms worsen or fail to improve. HISTORY OF PRESENT ILLNESS      Anup Palomino is a 64 y.o. female presents for follow up of back pain. LV referred to PT, trial of Lyrica 75 mg BID. She reports that her low back pain and right sciatica are improving. She has occasional numbness and tingling in her right medial calf. Pt also describes an episodic burning sensation in her right posterior thigh. She is ready to go on her cruise. She has not started the Lyrica. She currently takes Tylenol every 10 hours PRN with benefit. Pt notes great benefit with PT. She is complaint her HEP.  reviewed, last filled Tramadol 2022 #21    Pain Assessment  2022   Location of Pain Hip   Location Modifiers Right   Severity of Pain 4   Quality of Pain Dull; Other (Comment)   Quality of Pain Comment numbness to right thigh and foot   Duration of Pain Persistent   Frequency of Pain Intermittent   Aggravating Factors Other (Comment)   Aggravating Factors Comment nothing in particular   Limiting Behavior Yes   Relieving Factors Other (Comment)   Relieving Factors Comment physical therapy   Result of Injury No         Onset of pain: 7/2022        Investigations:   L MRI 8/2022: right L5-S1 disc extrusion, multiple degenerative changes  Spine surgery consult: none     Treatments:  Physical therapy: 8/2022 with benefit  Spinal injections: no  Spinal surgery- no  Beneficial medications: Tramadol, Tylenol, Flexeril  Failed medications: MDP x 2     Work Status: retired  Pertinent PMHx:  GERD, HTN, HLD.      PHYSICAL EXAMINATION    Visit Vitals  /71 (BP 1 Location: Left upper arm, BP Patient Position: Sitting, BP Cuff Size: Large adult)   Pulse 78   Temp 98.5 °F (36.9 °C) (Oral)   Ht 5' 4\" (1.626 m)   Wt 210 lb (95.3 kg)   SpO2 97%   BMI 36.05 kg/m²       TTP right sciatic notch, B/L trochanteric bursa  LE strength intact minimal right eversion weakness  SLR negative                Written by Magi Anglin, as dictated by Cindi Gamino MD.

## 2022-09-09 ENCOUNTER — HOSPITAL ENCOUNTER (OUTPATIENT)
Dept: PHYSICAL THERAPY | Age: 77
Discharge: HOME | End: 2022-09-09
Attending: PHYSICAL MEDICINE & REHABILITATION
Payer: MEDICARE

## 2022-09-09 ENCOUNTER — APPOINTMENT (OUTPATIENT)
Dept: PHYSICAL THERAPY | Age: 77
End: 2022-09-09
Attending: PHYSICAL MEDICINE & REHABILITATION
Payer: MEDICARE

## 2022-09-09 PROCEDURE — 97112 NEUROMUSCULAR REEDUCATION: CPT

## 2022-09-09 PROCEDURE — 97110 THERAPEUTIC EXERCISES: CPT

## 2022-09-09 NOTE — PROGRESS NOTES
PT DAILY TREATMENT NOTE     Patient Name: Kamari Hawthorne  Date:2022  : 1945  [x]  Patient  Verified  Payor: Javier Louise / Plan: [place] MEDICARE PART A & B / Product Type: Medicare /    In time:8:46  Out time:9:34  Total Treatment Time (min): 48  Visit #: 5 of 8    Medicare/BCBS Only   Total Timed Codes (min):  38 1:1 Treatment Time:  38       Treatment Area: Other low back pain [M54.59]    SUBJECTIVE  Pain Level (0-10 scale): 2  Any medication changes, allergies to medications, adverse drug reactions, diagnosis change, or new procedure performed?: [x] No    [] Yes (see summary sheet for update)  Subjective functional status/changes:   [] No changes reported  Patient reports she has noticed she is able to walk longer before he right leg limits her. OBJECTIVE            Modality rationale: decrease edema, decrease inflammation, and decrease pain to improve the patients ability to increase tolerance to activity. Min Type Additional Details   10 [x]  Ice     []  heat  []  Ice massage  []  Laser   []  Anodyne Position:left side lying  Location: right hip   [] Skin assessment post-treatment:  []intact []redness- no adverse reaction    []redness - adverse reaction:      23 min Therapeutic Exercise:  [] See flow sheet :   Rationale: increase ROM and increase strength to improve the patients ability to increase her functional activity level. 15 min Neuromuscular Re-education:  []  See flow sheet :   Rationale: increase strength, improve coordination, and increase proprioception  to improve the patients ability to increase core strength to improve lumbar stability. With   [] TE   [] TA   [] neuro   [] other: Patient Education: [x] Review HEP    [] Progressed/Changed HEP based on:   [] positioning   [] body mechanics   [] transfers   [] heat/ice application    [] other:      Other Objective/Functional Measures: right hip abduction MMT = 3/5.      Pain Level (0-10 scale) post treatment: 0    ASSESSMENT/Changes in Function: Patient continues to have weakness along right LE. Patient feels unsteady with single leg stance on right LE event with 1 UE hand held assist.     Patient will continue to benefit from skilled PT services to modify and progress therapeutic interventions, address functional mobility deficits, address ROM deficits, address strength deficits, analyze and address soft tissue restrictions, analyze and cue movement patterns, analyze and modify body mechanics/ergonomics, assess and modify postural abnormalities, address imbalance/dizziness, and instruct in home and community integration to attain remaining goals. []  See Plan of Care  []  See progress note/recertification  []  See Discharge Summary         Progress towards goals / Updated goals:  Short Term Goals: To be accomplished in 2 weeks:  1. Patient will subjectively report full compliance with prescribed HEP. Eval: HEP provided  Current:  Reports daily compliance with her HEP.  8/29/2022. Goal Met.  2. Patient will demonstrate right hip abduction MMT 5/5 to improve safety with ambulation on uneven surfaces. Eval: Right Hip Abduction MMT = 4/5  Current:Regressed; right hip abduction MMT = 3/5. 9/9/22  3. Patient will demonstrate right SLS >/= 20 seconds without pain to improve ease with stair management. Eval: Right SLS < 5 sec (right calf/ankle pain)     Long Term Goals: To be accomplished in 4 weeks:  1. Patient will demonstrate a significant functional improvement as demonstrated by a score of >/= 62 on FOTO. Eval: FOTO = 47       2. Patient will subjectively report ambulation tolerance >/= 30 minutes to enable return back to walking regime for exercise. Eval: Ambulation Tolerance = 5-10 minutes  Current: Remains: ambulation tolerance 10 mins. 9/9/22  3. Patient will demonstrate (-) right supine SLR to improve ease with donning shoewear.   Eval: (+) Right Supine SLR       PLAN  []  Upgrade activities as tolerated [x]  Continue plan of care  []  Update interventions per flow sheet       []  Discharge due to:_  []  Other:_      Caitlin Infante, LU 9/9/2022  9:05 AM    Future Appointments   Date Time Provider Rosamaria Vandana   9/12/2022  1:15 PM Kathy, [address] [facility]   9/13/2022  2:45 PM Naveen Brooke, PT [facility]   9/26/2022  1:15 PM Jose Hutchison, PT [facility]   9/28/2022  2:00 PM Nereyda Issa, LU [facility]   10/3/2022  2:00 PM Nereyda Issa, LU [facility]

## 2022-09-12 ENCOUNTER — HOSPITAL ENCOUNTER (OUTPATIENT)
Dept: PHYSICAL THERAPY | Age: 77
Discharge: HOME | End: 2022-09-12
Attending: PHYSICAL MEDICINE & REHABILITATION
Payer: MEDICARE

## 2022-09-12 PROCEDURE — 97112 NEUROMUSCULAR REEDUCATION: CPT

## 2022-09-12 PROCEDURE — 97110 THERAPEUTIC EXERCISES: CPT

## 2022-09-12 PROCEDURE — 97116 GAIT TRAINING THERAPY: CPT

## 2022-09-12 NOTE — PROGRESS NOTES
PT DAILY TREATMENT NOTE     Patient Name: Kenna Frost  Date:2022  : 1945  [x]  Patient  Verified  Payor: Jovana Brown / Plan: [place] MED[place] & B / Product Type: Medicare /    In time:110  Out time:213  Total Treatment Time (min): 63  Visit #: 6 of 8    Medicare/BCBS Only   Total Timed Codes (min):  53 1:1 Treatment Time:  53       Treatment Area: Other low back pain [M54.59]    SUBJECTIVE  Pain Level (0-10 scale): 1-2  Any medication changes, allergies to medications, adverse drug reactions, diagnosis change, or new procedure performed?: [x] No    [] Yes (see summary sheet for update)  Subjective functional status/changes:   [] No changes reported  Patient reports only feeling a slight discomfort along her right posterolateral buttock Patient reports overall significant improvement since start of care. Patient reports she is to be leaving for a 9 day cruise 9/15/2022.      OBJECTIVE    Modality rationale: decrease edema, decrease inflammation, and decrease pain to improve the patients ability to improve ease with sleep   Min Type Additional Details   10 [x]  Ice     []  heat  []  Ice massage Position: Left Sidelying  Location: Right Hip, Post-Tx     25 min Therapeutic Exercise:  [x] See flow sheet :  Emphasis placed on improving available lumbopelvic and LE AROM and strength   Rationale: increase ROM and increase strength to improve the patients ability to improve ease with community ADLs    8 min Gait Training:  []  See flow sheet :  Education re: adjustment of SPC to correct height  Gait x80' Education in AD sequencing with use of SPC and a step through gait pattern with SPC utilized in left UE  Stair Negotion with SPC - Use of 1 handrail and SPC x4 steps ascent/descent with reciprocal gait pattern, Use of SPC (no handrail) x4 steps ascent/descent with step-to gait pattern (right LE leading), Use of SPC (no handrail) x4 steps ascent/descent with step-to gait pattern (left LE leading) Rationale: increase ROM, increase strength, and increase proprioception  to improve the patients ability to improve walking tolerance. 20 min Neuromuscular Re-education:  [x]  See flow sheet : Emphasis placed on improving activation and recruitment of the gluteal musculature and improving LE proprioceptive and kinesthetic awareness and static and dynamic balance   Rationale: increase ROM, increase strength, improve balance, and increase proprioception  to improve the patients ability to decrease falls risk    With   [] TE   [] TA   [] neuro   [] other: Patient Education: [x] Review HEP    [] Progressed/Changed HEP based on:   [] positioning   [] body mechanics   [] transfers   [] heat/ice application    [] other:      Other Objective/Functional Measures:   Right Hip Abduction MMT = 4/5     Pain Level (0-10 scale) post treatment: 0    ASSESSMENT/Changes in Function: To reassess patient next treatment session with patient to be going out of town 9/15/2022 x9 days. With significant subjective and objective improvement since start of care as demonstrated with goal reassessment and functional mobility. Patient encouraged to bring [facility] with her on cruise just in case, to enable full participation in excursions. With completion of gait training to enable patient to utilize [facility] while on trip PRN effectively. Patient will continue to benefit from skilled PT services to modify and progress therapeutic interventions, address functional mobility deficits, address ROM deficits, address strength deficits, analyze and address soft tissue restrictions, analyze and cue movement patterns, analyze and modify body mechanics/ergonomics, assess and modify postural abnormalities, address imbalance/dizziness, and instruct in home and community integration to attain remaining goals.      []  See Plan of Care  []  See progress note/recertification  []  See Discharge Summary         Progress towards goals / Updated goals:    Short Term Goals: To be accomplished in 2 weeks:  1. Patient will subjectively report full compliance with prescribed HEP. Eval: HEP provided  Current: Met, Reports daily compliance with her HEP.  8/29/2022.  2. Patient will demonstrate right hip abduction MMT 5/5 to improve safety with ambulation on uneven surfaces. Eval: Right Hip Abduction MMT = 4/5  Current: Remains, Right Hip Abduction MMT = 4/5, 9/12/2022  3. Patient will demonstrate right SLS >/= 20 seconds without pain to improve ease with stair management. Eval: Right SLS < 5 sec (right calf/ankle pain)  Current: Remains, Right SLS < 5 sec (right calf/ankle pain 4/10), 9/12/2022     Long Term Goals: To be accomplished in 4 weeks:  1. Patient will demonstrate a significant functional improvement as demonstrated by a score of >/= 62 on FOTO. Eval: FOTO = 47       2. Patient will subjectively report ambulation tolerance >/= 30 minutes to enable return back to walking regime for exercise. Eval: Ambulation Tolerance = 5-10 minutes  Current: Remains, Ambulation Tolerance = 10 minute (partially secondary to chronic arthritic knee pain subjectively), 9/12/2022  3. Patient will demonstrate (-) right supine SLR to improve ease with donning shoewear.   Eval: (+) Right Supine SLR  Current: Met, (+) Right Supine SLR, 9/12/2022       PLAN  [x]  Upgrade activities as tolerated     [x]  Continue plan of care  []  Update interventions per flow sheet       []  Discharge due to:_  []  Other:_      Renee Hoyt, PT 9/12/2022  9:37 AM    Future Appointments   Date Time Provider Rosamaria Ross   9/12/2022  1:15 PM Beatris Hess, PT [facility]   9/13/2022  2:45 PM Alisa Naranjo, PT [facility]   9/26/2022  1:15 PM Beatris Hess, PT [facility]   9/28/2022  2:00 PM Donald Bell, PTA [facility]   10/3/2022  2:00 PM Donald Bell, PTA [facility]

## 2022-09-13 ENCOUNTER — HOSPITAL ENCOUNTER (OUTPATIENT)
Dept: PHYSICAL THERAPY | Age: 77
Discharge: HOME | End: 2022-09-13
Attending: PHYSICAL MEDICINE & REHABILITATION
Payer: MEDICARE

## 2022-09-13 PROCEDURE — 97110 THERAPEUTIC EXERCISES: CPT | Performed by: PHYSICAL THERAPIST

## 2022-09-13 PROCEDURE — 97116 GAIT TRAINING THERAPY: CPT | Performed by: PHYSICAL THERAPIST

## 2022-09-13 PROCEDURE — 97112 NEUROMUSCULAR REEDUCATION: CPT | Performed by: PHYSICAL THERAPIST

## 2022-09-13 NOTE — THERAPY RECERTIFICATION
[facility] - [facility]              [address]        [place], [address]   [phone] fax    Continued Plan of Care/ Re-certification for Physical Therapy Services    Patient name: Valentine Morejon Start of Care: 2022   Referral source: Rolando Ribeiro MD : 1945   Medical/Treatment Diagnosis: Other low back pain [M54.59]  Payor: Amanda Juarezors / Plan: [place] MEDICARE PART A & B / Product Type: Medicare /  Onset Date:2022     Prior Hospitalization: see medical history Provider#: [id number]   Medications: Verified on Patient Summary List    Comorbidities: HTN, BMI>30, GERD, Anxiety, Arthritis    Prior Level of Function: Retired, Walking for Exercise (3x/day; 20-30 minutes), Gym Regime, Walking without AD, No Fall History, (I) Functional ADLs, (I) Self-Care ADLs, (I) Driving    Visits from Start of Care: 7    Missed Visits: 0    The Plan of Care and following information is based on the patient's current status:  Short Term Goals: To be accomplished in 2 weeks:  1. Patient will subjectively report full compliance with prescribed HEP. Eval: HEP provided  Current: Met, Reports daily compliance with her HEP.  2022.  2. Patient will demonstrate right hip abduction MMT 5/5 to improve safety with ambulation on uneven surfaces. Eval: Right Hip Abduction MMT = 4/5  Current: Remains, Right Hip Abduction MMT = 4/5, 2022  3. Patient will demonstrate right SLS >/= 20 seconds without pain to improve ease with stair management. Eval: Right SLS < 5 sec (right calf/ankle pain)  Current: Remains, Right SLS < 5 sec (right calf/ankle pain 4/10), 2022     Long Term Goals: To be accomplished in 4 weeks:  1. Patient will demonstrate a significant functional improvement as demonstrated by a score of >/= 62 on FOTO.   Eval: FOTO = 47    Current:  FOTO = 52.  2022.       2. Patient will subjectively report ambulation tolerance >/= 30 minutes to enable return back to walking regime for exercise. Eval: Ambulation Tolerance = 5-10 minutes  Current: Remains, Ambulation Tolerance = 10 minute (partially secondary to chronic arthritic knee pain subjectively), 9/12/2022  3. Patient will demonstrate (-) right supine SLR to improve ease with donning shoewear. Eval: (+) Right Supine SLR  Current: Met, (+) Right Supine SLR, 9/12/2022    Key functional changes: Patient has made gains in strength and function. Problems/ barriers to goal attainment: None     Problem List: pain affecting function, decrease ROM, decrease strength, impaired gait/ balance, decrease ADL/ functional abilitiies, and decrease activity tolerance    Treatment Plan: Therapeutic exercise, Therapeutic activities, Neuromuscular re-education, Physical agent/modality, Gait/balance training, and Manual therapy     Patient Goal (s) has been updated and includes: To have the pain and numbness go away and walk better with improved balance. Goals for this certification period to be accomplished in 10 treatments:  1. Patient will demonstrate right hip abduction MMT 5/5 to improve safety with ambulation on uneven surfaces. Recert: Remains, Right Hip Abduction MMT = 4/5, 9/12/2022  2. Patient will demonstrate right SLS >/= 20 seconds without pain to improve ease with stair management. Recert: Remains, Right SLS < 5 sec (right calf/ankle pain 4/10), 9/12/2022  3. Patient will demonstrate a significant functional improvement as demonstrated by a score of >/= 62 on FOTO. Recert:  FOTO = 52.  9/95/9661. Progressing       4. Patient will subjectively report ambulation tolerance >/= 30 minutes to enable return back to walking regime for exercise. Recert: Ambulation Tolerance = 10 minute (partially secondary to chronic arthritic knee pain subjectively), 9/12/2022. No change.     Frequency / Duration: Patient to be seen 2 times per week for 10 treatments:    Assessment / Recommendations:Patient with continued back and gait dysfunction. She has left knee pain affecting her progress but she does have subjective gains in function as noted on her FOTO score. Patient will continue to benefit from skilled PT services to modify and progress therapeutic interventions, address functional mobility deficits, address ROM deficits, address strength deficits, analyze and address soft tissue restrictions, analyze and cue movement patterns, analyze and modify body mechanics/ergonomics, assess and modify postural abnormalities, address imbalance/dizziness, and instruct in home and community integration to attain remaining goals. Certification Period: 9/13/2022 - 10/12/2022    Florence Alexandraa, PT 9/13/2022 4:03 PM    ________________________________________________________________________  I certify that the above Therapy Services are being furnished while the patient is under my care. I agree with the treatment plan and certify that this therapy is necessary. [] I have read the above and request that my patient continue as recommended.   [] I have read the above report and request that my patient continue therapy with the following changes/special instructions: _______________________________________  [] I have read the above report and request that my patient be discharged from therapy    Physician's Signature:____________Date:_________TIME:________     Irena Trejo MD  ** Signature, Date and Time must be completed for valid certification **  Please sign and return to [facility] - [address] [place], [address] [place]   [phone] fax

## 2022-09-13 NOTE — PROGRESS NOTES
PT DAILY TREATMENT NOTE     Patient Name: Kenna Frost  Date:2022  : 1945  [x]  Patient  Verified  Payor: Jovana Brown / Plan: [place] MEDICARE PART A & B / Product Type: Medicare /    In time:2:50  Out time:3:56  Total Treatment Time (min): 66  Visit #: 7 of 8    Medicare/BCBS Only   Total Timed Codes (min):  56 1:1 Treatment Time:  38       Treatment Area: Other low back pain [M54.59]    SUBJECTIVE  Pain Level (0-10 scale): 2  Any medication changes, allergies to medications, adverse drug reactions, diagnosis change, or new procedure performed?: [x] No    [] Yes (see summary sheet for update)  Subjective functional status/changes:   [] No changes reported  Patient's CC is \"unsteadiness\"  States she \"wobbles a little bit\" when walking. OBJECTIVE    Modality rationale: decrease inflammation and decrease pain to improve the patients ability to increase tolerance to activity.    Min Type Additional Details    [] Estim:  []Unatt       []IFC  []Premod                        []Other:  []w/ice   []w/heat  Position:  Location:    [] Estim: []Att    []TENS instruct  []NMES                    []Other:  []w/US   []w/ice   []w/heat  Position:  Location:    []  Traction: [] Cervical       []Lumbar                       [] Prone          []Supine                       []Intermittent   []Continuous Lbs:  [] before manual  [] after manual    []  Ultrasound: []Continuous   [] Pulsed                           []1MHz   []3MHz W/cm2:  Location:    []  Iontophoresis with dexamethasone         Location: [] Take home patch   [] In clinic   10 [x]  Ice     []  heat  []  Ice massage  []  Laser   []  Anodyne Position:left side lying  Location: right hip    []  Laser with stim  []  Other:  Position:  Location:    []  Vasopneumatic Device    []  Right     []  Left  Pre-treatment girth:  Post-treatment girth:  Measured at (location):  Pressure:       [] lo [] med [] hi   Temperature: [] lo [] med [] hi   [] Skin assessment post-treatment:  []intact []redness- no adverse reaction    []redness - adverse reaction:     20 min Therapeutic Exercise:  [x] See flow sheet :  Emphasis placed on improving available lumbopelvic and LE AROM and strength   Rationale: increase ROM and increase strength to improve the patients ability to improve ease with community ADLs     8 min Gait Training:  []  See flow sheet : Reinforced below:  Education re: adjustment of SPC to correct height  Gait x80' Education in AD sequencing with use of SPC and a step through gait pattern with SPC utilized in left UE  Stair Negotion with SPC - Use of 1 handrail and SPC x4 steps ascent/descent with reciprocal gait pattern, Use of SPC (no handrail) x4 steps ascent/descent with step-to gait pattern (right LE leading), Use of SPC (no handrail) x4 steps ascent/descent with step-to gait pattern (left LE leading)   Rationale: increase ROM, increase strength, and increase proprioception  to improve the patients ability to improve walking tolerance. 28 min Neuromuscular Re-education:  [x]  See flow sheet : Emphasis placed on improving activation and recruitment of the gluteal musculature and improving LE proprioceptive and kinesthetic awareness and static and dynamic balance   Rationale: increase ROM, increase strength, improve balance, and increase proprioception  to improve the patients ability to decrease falls risk          With   [] TE   [] TA   [] neuro   [] other: Patient Education: [x] Review HEP    [] Progressed/Changed HEP based on:   [] positioning   [] body mechanics   [] transfers   [] heat/ice application    [] other:      Other Objective/Functional Measures:  Reviewed use of cane for ambulation on level surfaces and on stairs. Pain Level (0-10 scale) post treatment: 0    ASSESSMENT/Changes in Function: Patient with continued back and gait dysfunction.  She has left knee pain affecting her progress but she does have subjective gains in function as noted on her FOTO score. Patient will continue to benefit from skilled PT services to modify and progress therapeutic interventions, address functional mobility deficits, address ROM deficits, address strength deficits, analyze and address soft tissue restrictions, analyze and cue movement patterns, analyze and modify body mechanics/ergonomics, assess and modify postural abnormalities, address imbalance/dizziness, and instruct in home and community integration to attain remaining goals. [x]  See Plan of Care  []  See progress note/recertification  []  See Discharge Summary         Progress towards goals / Updated goals:  Short Term Goals: To be accomplished in 2 weeks:  1. Patient will subjectively report full compliance with prescribed HEP. Eval: HEP provided  Current: Met, Reports daily compliance with her HEP.  8/29/2022.  2. Patient will demonstrate right hip abduction MMT 5/5 to improve safety with ambulation on uneven surfaces. Eval: Right Hip Abduction MMT = 4/5  Current: Remains, Right Hip Abduction MMT = 4/5, 9/12/2022  3. Patient will demonstrate right SLS >/= 20 seconds without pain to improve ease with stair management. Eval: Right SLS < 5 sec (right calf/ankle pain)  Current: Remains, Right SLS < 5 sec (right calf/ankle pain 4/10), 9/12/2022     Long Term Goals: To be accomplished in 4 weeks:  1. Patient will demonstrate a significant functional improvement as demonstrated by a score of >/= 62 on FOTO. Eval: FOTO = 47    Current:  FOTO = 52.  9/13/2022.       2. Patient will subjectively report ambulation tolerance >/= 30 minutes to enable return back to walking regime for exercise. Eval: Ambulation Tolerance = 5-10 minutes  Current: Remains, Ambulation Tolerance = 10 minute (partially secondary to chronic arthritic knee pain subjectively), 9/12/2022  3. Patient will demonstrate (-) right supine SLR to improve ease with donning shoewear.   Eval: (+) Right Supine SLR  Current: Met, (+) Right Supine SLR, 9/12/2022       PLAN  [x]  Upgrade activities as tolerated     [x]  Continue plan of care  []  Update interventions per flow sheet       []  Discharge due to:_  []  Other:_      Andrea Palma, PT 9/13/2022  3:00 PM    Future Appointments   Date Time Provider Rosamaria Ross   9/26/2022  1:15 PM Emmy Oakley, PT [facility]   9/28/2022  2:00 PM Carlene Luong [facility]   10/3/2022  2:00 PM Loretta Burr PTA [facility]

## 2022-09-26 ENCOUNTER — TELEPHONE (OUTPATIENT)
Dept: PHYSICAL THERAPY | Age: 77
End: 2022-09-26

## 2022-09-26 ENCOUNTER — HOSPITAL ENCOUNTER (OUTPATIENT)
Dept: PHYSICAL THERAPY | Age: 77
End: 2022-09-26
Attending: PHYSICAL MEDICINE & REHABILITATION
Payer: MEDICARE

## 2022-09-28 ENCOUNTER — APPOINTMENT (OUTPATIENT)
Dept: PHYSICAL THERAPY | Age: 77
End: 2022-09-28
Attending: PHYSICAL MEDICINE & REHABILITATION
Payer: MEDICARE

## 2022-10-03 ENCOUNTER — HOSPITAL ENCOUNTER (OUTPATIENT)
Dept: PHYSICAL THERAPY | Age: 77
Discharge: HOME | End: 2022-10-03
Attending: PHYSICAL MEDICINE & REHABILITATION
Payer: MEDICARE

## 2022-10-03 PROCEDURE — 97112 NEUROMUSCULAR REEDUCATION: CPT

## 2022-10-03 PROCEDURE — 97110 THERAPEUTIC EXERCISES: CPT

## 2022-10-03 NOTE — PROGRESS NOTES
PT DAILY TREATMENT NOTE     Patient Name: Anthony Stanton  Date:10/3/2022  : 1945  [x]  Patient  Verified  Payor: Mario Shanks / Plan: [place] MEDICARE PART A & B / Product Type: Medicare /    In time:1:56  Out time:2:44  Total Treatment Time (min): 48  Visit #: 1 of 10    Medicare/BCBS Only   Total Timed Codes (min):  38 1:1 Treatment Time:  38       Treatment Area: Other low back pain [M54.59]    SUBJECTIVE  Pain Level (0-10 scale): 4  Any medication changes, allergies to medications, adverse drug reactions, diagnosis change, or new procedure performed?: [x] No    [] Yes (see summary sheet for update)  Subjective functional status/changes:   [] No changes reported  Patient reports she did well on her cruises without being limited by her hip or back pain, she mostly had knee pain with the prolonged walking. Patient reports she has not done her exercises much recently due to having COVID last week. OBJECTIVE              Modality rationale: decrease inflammation and decrease pain to improve the patients ability to increase tolerance to activity.     Min Type Additional Details   10 [x]  Ice     []  heat  []  Ice massage  []  Laser   []  Anodyne Position:left side lying  Location: right hip   [] Skin assessment post-treatment:  []intact []redness- no adverse reaction    []redness - adverse reaction:      10 min Therapeutic Exercise:  [x] See flow sheet :  Emphasis placed on improving available lumbopelvic and LE AROM and strength   Rationale: increase ROM and increase strength to improve the patients ability to improve ease with community ADLs        28 min Neuromuscular Re-education:  [x]  See flow sheet : Emphasis placed on improving activation and recruitment of the gluteal musculature and improving LE proprioceptive and kinesthetic awareness and static and dynamic balance   Rationale: increase ROM, increase strength, improve balance, and increase proprioception  to improve the patients ability to decrease falls risk       With   [] TE   [] TA   [] neuro   [] other: Patient Education: [x] Review HEP    [] Progressed/Changed HEP based on:   [] positioning   [] body mechanics   [] transfers   [] heat/ice application    [] other:      Other Objective/Functional Measures: Ambulation tolerance = 20 mins. Pain Level (0-10 scale) post treatment: 0    ASSESSMENT/Changes in Function: Patient has made improvement with ambulation tolerance and states she was able to perform prolong walking on cruise without use of SPC due to forgetting it at home. Patient will continue to benefit from skilled PT services to modify and progress therapeutic interventions, address functional mobility deficits, address ROM deficits, address strength deficits, analyze and address soft tissue restrictions, analyze and cue movement patterns, analyze and modify body mechanics/ergonomics, assess and modify postural abnormalities, address imbalance/dizziness, and instruct in home and community integration to attain remaining goals. []  See Plan of Care  []  See progress note/recertification  []  See Discharge Summary         Progress towards goals / Updated goals:  Goals for this certification period to be accomplished in 10 treatments:  1. Patient will demonstrate right hip abduction MMT 5/5 to improve safety with ambulation on uneven surfaces. Recert: Remains, Right Hip Abduction MMT = 4/5, 9/12/2022  2. Patient will demonstrate right SLS >/= 20 seconds without pain to improve ease with stair management. Recert: Remains, Right SLS < 5 sec (right calf/ankle pain 4/10), 9/12/2022  3. Patient will demonstrate a significant functional improvement as demonstrated by a score of >/= 62 on FOTO. Recert:  FOTO = 52.  4/70/8120. Progressing       4. Patient will subjectively report ambulation tolerance >/= 30 minutes to enable return back to walking regime for exercise.   Recert: Ambulation Tolerance = 10 minute (partially secondary to chronic arthritic knee pain subjectively), 9/12/2022. No change. Current: progressing: Ambulation tolerance = 20 mins.  10/3/22    PLAN  []  Upgrade activities as tolerated     [x]  Continue plan of care  []  Update interventions per flow sheet       []  Discharge due to:_  []  Other:_      Jackie Tanner PTA 10/3/2022  12:12 PM    Future Appointments   Date Time Provider [facility]   10/3/2022  2:00 PM Benson Yung PTA [facility]

## 2022-10-05 ENCOUNTER — HOSPITAL ENCOUNTER (OUTPATIENT)
Dept: PHYSICAL THERAPY | Age: 77
Discharge: HOME | End: 2022-10-05
Attending: PHYSICAL MEDICINE & REHABILITATION
Payer: MEDICARE

## 2022-10-05 PROCEDURE — 97110 THERAPEUTIC EXERCISES: CPT

## 2022-10-05 PROCEDURE — 97112 NEUROMUSCULAR REEDUCATION: CPT

## 2022-10-05 NOTE — PROGRESS NOTES
PT DAILY TREATMENT NOTE     Patient Name: Kim Henry  Date:10/5/2022  : 1945  [x]  Patient  Verified  Payor: Stevie Melvin / Plan: [place] & [place] / Product Type: Medicare /    In time:145  Out time:233  Total Treatment Time (min): 48  Visit #: 2 of 10    Medicare/BCBS Only   Total Timed Codes (min):  38 1:1 Treatment Time:  38       Treatment Area: Other low back pain [M54.59]    SUBJECTIVE  Pain Level (0-10 scale): 0  Any medication changes, allergies to medications, adverse drug reactions, diagnosis change, or new procedure performed?: [x] No    [] Yes (see summary sheet for update)  Subjective functional status/changes:   [] No changes reported  Patient denied pain upon arrival    OBJECTIVE                       Modality rationale: decrease inflammation and decrease pain to improve the patients ability to increase tolerance to activity.      Min Type Additional Details    10 [x]  Ice     []  heat  []  Ice massage  []  Laser   []  Anodyne Position:left side lying  Location: right hip    [] Skin assessment post-treatment:  []intact []redness- no adverse reaction    []redness - adverse reaction:         10 min Therapeutic Exercise:  [x] See flow sheet :  Emphasis placed on improving available lumbopelvic and LE AROM and strength   Rationale: increase ROM and increase strength to improve the patients ability to improve ease with community ADLs        28 min Neuromuscular Re-education:  [x]  See flow sheet : Emphasis placed on improving activation and recruitment of the gluteal musculature and improving LE proprioceptive and kinesthetic awareness and static and dynamic balance   Rationale: increase ROM, increase strength, improve balance, and increase proprioception  to improve the patients ability to decrease falls risk            With   [] TE   [] TA   [] neuro   [] other: Patient Education: [x] Review HEP    [] Progressed/Changed HEP based on:   [] positioning   [] body mechanics   [] transfers   [] heat/ice application    [] other:      Other Objective/Functional Measures:   Progressed therex per flow sheet     Pain Level (0-10 scale) post treatment: 0    ASSESSMENT/Changes in Function: progressed therex per flow sheet with good tolerance and no increase in pain    Patient will continue to benefit from skilled PT services to modify and progress therapeutic interventions, address functional mobility deficits, address ROM deficits, address strength deficits, analyze and address soft tissue restrictions, and analyze and cue movement patterns to attain remaining goals. [x]  See Plan of Care  []  See progress note/recertification  []  See Discharge Summary         Progress towards goals / Updated goals:  Goals for this certification period to be accomplished in 10 treatments:  1. Patient will demonstrate right hip abduction MMT 5/5 to improve safety with ambulation on uneven surfaces. Recert: Remains, Right Hip Abduction MMT = 4/5, 9/12/2022  2. Patient will demonstrate right SLS >/= 20 seconds without pain to improve ease with stair management. Recert: Remains, Right SLS < 5 sec (right calf/ankle pain 4/10), 9/12/2022  3. Patient will demonstrate a significant functional improvement as demonstrated by a score of >/= 62 on FOTO. Recert:  FOTO = 52.  5/29/2852. Progressing       4. Patient will subjectively report ambulation tolerance >/= 30 minutes to enable return back to walking regime for exercise. Recert: Ambulation Tolerance = 10 minute (partially secondary to chronic arthritic knee pain subjectively), 9/12/2022. No change. Current: progressing: Ambulation tolerance = 20 mins.  10/3/22    PLAN  []  Upgrade activities as tolerated     [x]  Continue plan of care  []  Update interventions per flow sheet       []  Discharge due to:_  []  Other:_      Roger Arvizu, PTA 10/5/2022  1:45 PM    Future Appointments   Date Time Provider Rosamaria Ross   10/5/2022  2:00 PM Amanda Kaminski PTA [facility]   10/12/2022  2:00 PM Denise Collazo PTA [facility]

## 2022-10-12 ENCOUNTER — HOSPITAL ENCOUNTER (OUTPATIENT)
Dept: PHYSICAL THERAPY | Age: 77
Discharge: HOME | End: 2022-10-12
Attending: PHYSICAL MEDICINE & REHABILITATION
Payer: MEDICARE

## 2022-10-12 PROCEDURE — 97112 NEUROMUSCULAR REEDUCATION: CPT

## 2022-10-12 PROCEDURE — 97110 THERAPEUTIC EXERCISES: CPT

## 2022-10-12 NOTE — PROGRESS NOTES
[facility]              [address]        [place], [address] [place]   [phone] fax    Continued Plan of Care/ Re-certification for Physical Therapy Services    Patient name: Alma Rodrigues Start of Care: 2022   Referral source: Frankie Zarate MD : 1945   Medical/Treatment Diagnosis: Other low back pain [M54.59]  Payor: Cinthya Orta / Plan: [place] MEDICARE PART A & B / Product Type: Medicare /  Onset Date:2022     Prior Hospitalization: see medical history Provider#: [id number]   Medications: Verified on Patient Summary List    Comorbidities: HTN, BMI>30, GERD, Anxiety, Arthritis    Prior Level of Function: Retired, Walking for Exercise (3x/day; 20-30 minutes), Gym Regime, Walking without AD, No Fall History, (I) Functional ADLs, (I) Self-Care ADLs, (I) Driving  Visits from Start of Care: 10    Missed Visits: 1    The Plan of Care and following information is based on the patient's current status:    1. Patient will demonstrate right hip abduction MMT 5/5 to improve safety with ambulation on uneven surfaces. Recert: Remains, Right Hip Abduction MMT = 4/5,   Current: progressing: right hip abduction MMT = 4+/5.   2. Patient will demonstrate right SLS >/= 20 seconds without pain to improve ease with stair management. Recert: Remains, Right SLS < 5 sec (right calf/ankle pain 4/10),   Current: not tested due to patient having right knee buckle this morning. 3. Patient will demonstrate a significant functional improvement as demonstrated by a score of >/= 62 on FOTO. Recert:  FOTO = 52. . Progressing  Current: Progressing: FOTO = 57.        4. Patient will subjectively report ambulation tolerance >/= 30 minutes to enable return back to walking regime for exercise. Recert: Ambulation Tolerance = 10 minute (partially secondary to chronic arthritic knee pain subjectively), . No change. Current: progressing: Ambulation tolerance = 20 mins.      Key functional changes: see goals above      Problems/ barriers to goal attainment: N/A     Problem List: pain affecting function, decrease ROM, decrease strength, edema affecting function, impaired gait/ balance, decrease ADL/ functional abilitiies, decrease activity tolerance, decrease flexibility/ joint mobility, and decrease transfer abilities    Treatment Plan: Therapeutic exercise, Neuromuscular reeducation, Manual therapy, Therapeutic activity, Self care/home management, Electric stim unattended , and Gait training     Patient Goal (s) has been updated and includes: \"I want less pain in my back and hip\"      Goals for this certification period to be accomplished in 4 weeks:  1. Patient will demonstrate right hip abduction MMT 5/5 to improve safety with ambulation on uneven surfaces. Recert: Progressing: right hip abduction MMT = 4+/5.   2. Patient will demonstrate right SLS >/= 20 seconds without pain to improve ease with stair management. Recert:  Not tested per patient discretion secondary to acute knee pain reported  3. Patient will demonstrate a significant functional improvement as demonstrated by a score of >/= 62 on FOTO. Recert: Progressing: FOTO = 57.        4. Patient will subjectively report ambulation tolerance >/= 30 minutes to enable return back to walking regime for exercise. Recert: Progressing: Ambulation tolerance = 20 mins. Frequency / Duration: Patient to be seen 2 times per week for 4 weeks:    Assessment / Recommendations:Patient with lapse in care since previous recertification secondary to planned vacation and personal illness, with patient having been diagnosed with COVID-19. Patient has improved with standing tolerance subjectively and right hip strength objectively.      Patient will continue to benefit from skilled PT services to modify and progress therapeutic interventions, address functional mobility deficits, address ROM deficits, address strength deficits, analyze and address soft tissue restrictions, analyze and cue movement patterns, analyze and modify body mechanics/ergonomics, assess and modify postural abnormalities, address imbalance/dizziness, and instruct in home and community integration to attain remaining goals. Certification Period: 10/13/2022-11/11/2022    Tone Borjas PTA 10/12/2022 2:22 PM    ________________________________________________________________________  I certify that the above Therapy Services are being furnished while the patient is under my care. I agree with the treatment plan and certify that this therapy is necessary. [] I have read the above and request that my patient continue as recommended.   [] I have read the above report and request that my patient continue therapy with the following changes/special instructions: _______________________________________  [] I have read the above report and request that my patient be discharged from therapy    Physician's Signature:____________Date:_________TIME:________     Delphine Bell MD  ** Signature, Date and Time must be completed for valid certification **  Please sign and return to [facility] - [address], [address] [place]   [phone] fax

## 2022-10-12 NOTE — PROGRESS NOTES
PT DAILY TREATMENT NOTE     Patient Name: Leslee Seals  Date:10/12/2022  : 1945  [x]  Patient  Verified  Payor: Evelin Clemens / Plan: [place] MEDICARE PART A & B / Product Type: Medicare /    In time:1:59  Out time:2:39  Total Treatment Time (min): 40  Visit #: 3 of 10    Medicare/BCBS Only   Total Timed Codes (min):  30 1:1 Treatment Time:  30       Treatment Area: Other low back pain [M54.59]    SUBJECTIVE  Pain Level (0-10 scale): 2  Any medication changes, allergies to medications, adverse drug reactions, diagnosis change, or new procedure performed?: [x] No    [] Yes (see summary sheet for update)  Subjective functional status/changes:   [] No changes reported  Patient reports that she received a cortisone shot in her left  knee yesterday and then this morning while taking a step, her right knee \"buckled\" on her. OBJECTIVE                            Modality rationale: decrease inflammation and decrease pain to improve the patients ability to increase tolerance to activity.      Min Type Additional Details    10 [x]  Ice     []  heat  []  Ice massage  []  Laser   []  Anodyne Position:left side lying  Location: right hip    [] Skin assessment post-treatment:  []intact []redness- no adverse reaction    []redness - adverse reaction:      10 min Therapeutic Exercise:  [x] See flow sheet :  Emphasis placed on improving available lumbopelvic and LE AROM and strength   Rationale: increase ROM and increase strength to improve the patients ability to improve ease with community ADLs        20 min Neuromuscular Re-education:  [x]  See flow sheet : Emphasis placed on improving activation and recruitment of the gluteal musculature and improving LE proprioceptive and kinesthetic awareness and static and dynamic balance   Rationale: increase ROM, increase strength, improve balance, and increase proprioception  to improve the patients ability to decrease falls risk                          With   [] TE   [] TA   [] neuro   [] other: Patient Education: [x] Review HEP    [] Progressed/Changed HEP based on:   [] positioning   [] body mechanics   [] transfers   [] heat/ice application    [] other:      Other Objective/Functional Measures: held on weightbearing exercises due to increase left knee pain. Pain Level (0-10 scale) post treatment: 0    ASSESSMENT/Changes in Function: Patient had laps in treatment since previous recertification due to going on vacation for a week and then being sick with COVID. Patient has improved with standing tolerance and right hip strength. Patient will continue to benefit from skilled PT services to modify and progress therapeutic interventions, address functional mobility deficits, address ROM deficits, address strength deficits, analyze and address soft tissue restrictions, analyze and cue movement patterns, analyze and modify body mechanics/ergonomics, assess and modify postural abnormalities, address imbalance/dizziness, and instruct in home and community integration to attain remaining goals. []  See Plan of Care  []  See progress note/recertification  []  See Discharge Summary         Progress towards goals / Updated goals:  Goals for this certification period to be accomplished in 10 treatments:  1. Patient will demonstrate right hip abduction MMT 5/5 to improve safety with ambulation on uneven surfaces. Recert: Remains, Right Hip Abduction MMT = 4/5, 9/12/2022  Current: progressing: right hip abduction MMT = 4+/5. 10/12/22  2. Patient will demonstrate right SLS >/= 20 seconds without pain to improve ease with stair management. Recert: Remains, Right SLS < 5 sec (right calf/ankle pain 4/10), 9/12/2022  Current: not tested due to patient having right knee buckle this morning. 10/12/22  3. Patient will demonstrate a significant functional improvement as demonstrated by a score of >/= 62 on FOTO. Recert:  FOTO = 52.  1/82/4748.  Progressing  Current: Progressing: FOTO = 57. 10/12/22       4. Patient will subjectively report ambulation tolerance >/= 30 minutes to enable return back to walking regime for exercise. Recert: Ambulation Tolerance = 10 minute (partially secondary to chronic arthritic knee pain subjectively), 9/12/2022. No change. Current: progressing: Ambulation tolerance = 20 mins.  10/12/22       PLAN  []  Upgrade activities as tolerated     [x]  Continue plan of care  []  Update interventions per flow sheet       []  Discharge due to:_  []  Other:_      Flora Noe PTA 10/12/2022  1:46 PM    Future Appointments   Date Time Provider Rosamaria Ross   10/12/2022  2:00 PM Clare Evans PTA [facility]

## 2022-10-20 ENCOUNTER — HOSPITAL ENCOUNTER (OUTPATIENT)
Dept: PHYSICAL THERAPY | Age: 77
Discharge: HOME | End: 2022-10-20
Attending: PHYSICAL MEDICINE & REHABILITATION
Payer: MEDICARE

## 2022-10-20 PROCEDURE — 97112 NEUROMUSCULAR REEDUCATION: CPT

## 2022-10-20 PROCEDURE — 97110 THERAPEUTIC EXERCISES: CPT

## 2022-10-20 PROCEDURE — 97530 THERAPEUTIC ACTIVITIES: CPT

## 2022-10-20 NOTE — PROGRESS NOTES
PT DAILY TREATMENT NOTE     Patient Name: Lucia Lyman  Date:10/20/2022  : 1945  [x]  Patient  Verified  Payor: Easton Shaw / Plan: [place] MEDICARE PART A & B / Product Type: Medicare /    In time:242  Out time: 332  Total Treatment Time (min): 50  Visit #: 4 of 10    Medicare/BCBS Only   Total Timed Codes (min):  40 1:1 Treatment Time:  40       Treatment Area: Other low back pain [M54.59]    SUBJECTIVE  Pain Level (0-10 scale): 0/10  Any medication changes, allergies to medications, adverse drug reactions, diagnosis change, or new procedure performed?: [x] No    [] Yes (see summary sheet for update)  Subjective functional status/changes:   [] No changes reported  I can stand as long as I hold onto something, my knee is still bothering me. No tingling in R leg today. OBJECTIVE    Modality rationale: decrease inflammation and decrease pain to improve the patients ability to increase tolerance to activity.      Min Type Additional Details     10 [x]  Ice     []  heat  []  Ice massage  []  Laser   []  Anodyne Position:left side lying  Location: right hip, left knee     [] Skin assessment post-treatment:  []intact []redness- no adverse reaction    []redness - adverse reaction:      10 min Therapeutic Exercise:  [x] See flow sheet :  Emphasis placed on improving available lumbopelvic and LE AROM and strength   Rationale: increase ROM and increase strength to improve the patients ability to improve ease with community ADLs     20 min Neuromuscular Re-education:  [x]  See flow sheet : Emphasis placed on improving activation and recruitment of the gluteal musculature and improving LE proprioceptive and kinesthetic awareness and static and dynamic balance   Rationale: increase ROM, increase strength, improve balance, and increase proprioception  to improve the patients ability to decrease falls risk                    10 min Therapeutic Activity: [x]  See flow sheet   Rationale:    increase ROM, increase strength, improve coordination, and improve balance to improve the patients ability to perform functional transfers              With   [x] TE   [] TA   [] neuro   [] other: Patient Education: [x] Review HEP    [] Progressed/Changed HEP based on:   [] positioning   [] body mechanics   [] transfers   [] heat/ice application    [] other:      Other Objective/Functional Measures:   - Reintroduced standing exercises today with UE support without increase in L knee symptoms   -      Pain Level (0-10 scale) post treatment: 0/10    ASSESSMENT/Changes in Function: Pt faired well during session today and tolerated reintroduction of standing exercises today. No increase in symptoms post treatment. Will continue to progress as able. Patient will continue to benefit from skilled PT services to modify and progress therapeutic interventions, address functional mobility deficits, address ROM deficits, address strength deficits, analyze and address soft tissue restrictions, analyze and cue movement patterns, analyze and modify body mechanics/ergonomics, assess and modify postural abnormalities, and instruct in home and community integration to attain remaining goals. []  See Plan of Care  []  See progress note/recertification  []  See Discharge Summary         Progress towards goals / Updated goals:  Goals for this certification period to be accomplished in 10 treatments:  1. Patient will demonstrate right hip abduction MMT 5/5 to improve safety with ambulation on uneven surfaces. Recert: Remains, Right Hip Abduction MMT = 4/5, 9/12/2022  Current: progressing: right hip abduction MMT = 4+/5. 10/12/22  2. Patient will demonstrate right SLS >/= 20 seconds without pain to improve ease with stair management. Recert: Remains, Right SLS < 5 sec (right calf/ankle pain 4/10), 9/12/2022  Current: not tested due to patient having right knee buckle this morning. 10/12/22  Current:  Tolerates 30\" SLS B with use of 1 UE 10/20/22  3. Patient will demonstrate a significant functional improvement as demonstrated by a score of >/= 62 on FOTO. Recert:  FOTO = 52.  3/45/1600. Progressing  Current: Progressing: FOTO = 57. 10/12/22       4. Patient will subjectively report ambulation tolerance >/= 30 minutes to enable return back to walking regime for exercise. Recert: Ambulation Tolerance = 10 minute (partially secondary to chronic arthritic knee pain subjectively), 9/12/2022. No change. Current: progressing: Ambulation tolerance = 20 mins.  10/12/22       PLAN  []  Upgrade activities as tolerated     [x]  Continue plan of care  []  Update interventions per flow sheet       []  Discharge due to:_  []  Other:_      Brandie Justice, PT 10/20/2022  2:37 PM    Future Appointments   Date Time Provider Rosamaria Ross   10/20/2022  2:45 PM Shanell Grajeda, PT [facility]   10/21/2022  2:45 PM Karina Smith PTA [facility]   10/24/2022 11:45 AM Karina Smith PTA [facility]   10/26/2022  2:45 PM Karina Smith PTA [facility]   10/31/2022 12:30 PM Karina Smith PTA [facility]   11/2/2022  2:00 PM Karina Smith PTA [facility]

## 2022-10-21 ENCOUNTER — HOSPITAL ENCOUNTER (OUTPATIENT)
Dept: PHYSICAL THERAPY | Age: 77
Discharge: HOME | End: 2022-10-21
Attending: PHYSICAL MEDICINE & REHABILITATION
Payer: MEDICARE

## 2022-10-21 PROCEDURE — 97110 THERAPEUTIC EXERCISES: CPT

## 2022-10-21 PROCEDURE — 97112 NEUROMUSCULAR REEDUCATION: CPT

## 2022-10-21 NOTE — PROGRESS NOTES
PT DAILY TREATMENT NOTE     Patient Name: Willem Quiñones  Date:10/21/2022  : 1945  [x]  Patient  Verified  Payor: Octavio Carcamo / Plan: [place] MEDICARE PART A & B / Product Type: Medicare /    In time:2:42  Out time:3:30  Total Treatment Time (min): 48  Visit #: 2 of 8    Medicare/BCBS Only   Total Timed Codes (min):  38 1:1 Treatment Time:  38       Treatment Area: Other low back pain [M54.59]    SUBJECTIVE  Pain Level (0-10 scale): 0  Any medication changes, allergies to medications, adverse drug reactions, diagnosis change, or new procedure performed?: [x] No    [] Yes (see summary sheet for update)  Subjective functional status/changes:   [] No changes reported  Patient states she is able to carry groceries up the stairs easier. OBJECTIVE               Modality rationale: decrease inflammation and decrease pain to improve the patients ability to increase tolerance to activity.      Min Type Additional Details     10 [x]  Ice     []  heat  []  Ice massage  []  Laser   []  Anodyne Position:left side lying  Location: right hip     [] Skin assessment post-treatment:  []intact []redness- no adverse reaction    []redness - adverse reaction:      10 min Therapeutic Exercise:  [x] See flow sheet :  Emphasis placed on improving available lumbopelvic and LE AROM and strength   Rationale: increase ROM and increase strength to improve the patients ability to improve ease with community ADLs        28 min Neuromuscular Re-education:  [x]  See flow sheet : Emphasis placed on improving activation and recruitment of the gluteal musculature and improving LE proprioceptive and kinesthetic awareness and static and dynamic balance   Rationale: increase ROM, increase strength, improve balance, and increase proprioception  to improve the patients ability to decrease falls risk             With   [] TE   [] TA   [] neuro   [] other: Patient Education: [x] Review HEP    [] Progressed/Changed HEP based on:   [] positioning   [] body mechanics   [] transfers   [] heat/ice application    [] other:      Other Objective/Functional Measures: un able to perform SLS without UE assist.      Pain Level (0-10 scale) post treatment: 0    ASSESSMENT/Changes in Function: Patient states continuing to have increase in right knee pain, but was able to tolerate all weightbearing exercises. Patient will continue to benefit from skilled PT services to modify and progress therapeutic interventions, address functional mobility deficits, address ROM deficits, address strength deficits, analyze and address soft tissue restrictions, analyze and cue movement patterns, analyze and modify body mechanics/ergonomics, assess and modify postural abnormalities, address imbalance/dizziness, and instruct in home and community integration to attain remaining goals. []  See Plan of Care  []  See progress note/recertification  []  See Discharge Summary         Progress towards goals / Updated goals:  Goals for this certification period to be accomplished in 4 weeks:  1. Patient will demonstrate right hip abduction MMT 5/5 to improve safety with ambulation on uneven surfaces. Recert: Progressing: right hip abduction MMT = 4+/5.   2. Patient will demonstrate right SLS >/= 20 seconds without pain to improve ease with stair management. Recert:  Not tested per patient discretion secondary to acute knee pain reported  Current: Tolerates 30\" SLS B with use of 1 UE 10/20/22  3. Patient will demonstrate a significant functional improvement as demonstrated by a score of >/= 62 on FOTO. Recert: Progressing: FOTO = 57.        4. Patient will subjectively report ambulation tolerance >/= 30 minutes to enable return back to walking regime for exercise. Recert: Progressing: Ambulation tolerance = 20 mins.     PLAN  []  Upgrade activities as tolerated     [x]  Continue plan of care  []  Update interventions per flow sheet       []  Discharge due to:_  []  Other:_ Claudia Mello PTA 10/21/2022  2:47 PM    Future Appointments   Date Time Provider Rosamaria Ross   10/24/2022 11:45 AM Seldon Rounds [facility]   10/26/2022  2:45 PM Seldon Rounds [facility]   10/31/2022 12:30 PM Abdullahi Hunt PTA [facility]   11/2/2022  2:00 PM Abdullahi Hunt PTA [facility]

## 2022-10-24 ENCOUNTER — HOSPITAL ENCOUNTER (OUTPATIENT)
Dept: PHYSICAL THERAPY | Age: 77
Discharge: HOME | End: 2022-10-24
Attending: PHYSICAL MEDICINE & REHABILITATION
Payer: MEDICARE

## 2022-10-24 PROCEDURE — 97110 THERAPEUTIC EXERCISES: CPT

## 2022-10-24 PROCEDURE — 97112 NEUROMUSCULAR REEDUCATION: CPT

## 2022-10-24 NOTE — PROGRESS NOTES
PT DAILY TREATMENT NOTE     Patient Name: Hallie Garcia  Date:10/24/2022  : 1945  [x]  Patient  Verified  Payor: Kellen Lynette / Plan: [place] & [place] / Product Type: Medicare /    In time:11:44  Out time:12:24  Total Treatment Time (min): 39  Visit #: 3 of 8    Medicare/BCBS Only   Total Timed Codes (min):  29 1:1 Treatment Time:  29       Treatment Area: Other low back pain [M54.59]    SUBJECTIVE  Pain Level (0-10 scale): 1  Any medication changes, allergies to medications, adverse drug reactions, diagnosis change, or new procedure performed?: [x] No    [] Yes (see summary sheet for update)  Subjective functional status/changes:   [] No changes reported  Patient states she feels she is able to walk up her stairs easier. Patient states she is getting her left  knee x-rayed today. OBJECTIVE                  Modality rationale: decrease inflammation and decrease pain to improve the patients ability to increase tolerance to activity.      Min Type Additional Details     10 [x]  Ice     []  heat  []  Ice massage  []  Laser   []  Anodyne Position:left side lying  Location: right hip     [] Skin assessment post-treatment:  []intact []redness- no adverse reaction    []redness - adverse reaction:      14 min Therapeutic Exercise:  [x] See flow sheet :  Emphasis placed on improving available lumbopelvic and LE AROM and strength   Rationale: increase ROM and increase strength to improve the patients ability to improve ease with community ADLs        15 min Neuromuscular Re-education:  [x]  See flow sheet : Emphasis placed on improving activation and recruitment of the gluteal musculature and improving LE proprioceptive and kinesthetic awareness and static and dynamic balance   Rationale: increase ROM, increase strength, improve balance, and increase proprioception  to improve the patients ability to decrease falls risk                                                                   With   [] TE   [] TA   [] neuro   [] other: Patient Education: [x] Review HEP    [] Progressed/Changed HEP based on:   [] positioning   [] body mechanics   [] transfers   [] heat/ice application    [] other:      Other Objective/Functional Measures: ambulation tolerance = 30 mins. Pain Level (0-10 scale) post treatment: 0    ASSESSMENT/Changes in Function: Held on standing exercises due to patient having increase pain in left knee. Patient will continue to benefit from skilled PT services to modify and progress therapeutic interventions, address functional mobility deficits, address ROM deficits, address strength deficits, analyze and address soft tissue restrictions, analyze and cue movement patterns, analyze and modify body mechanics/ergonomics, assess and modify postural abnormalities, address imbalance/dizziness, and instruct in home and community integration to attain remaining goals. []  See Plan of Care  []  See progress note/recertification  []  See Discharge Summary         Progress towards goals / Updated goals:  Goals for this certification period to be accomplished in 4 weeks:  1. Patient will demonstrate right hip abduction MMT 5/5 to improve safety with ambulation on uneven surfaces. Recert: Progressing: right hip abduction MMT = 4+/5.   2. Patient will demonstrate right SLS >/= 20 seconds without pain to improve ease with stair management. Recert:  Not tested per patient discretion secondary to acute knee pain reported  Current: Tolerates 30\" SLS B with use of 1 UE 10/20/22  3. Patient will demonstrate a significant functional improvement as demonstrated by a score of >/= 62 on FOTO. Recert: Progressing: FOTO = 57.        4. Patient will subjectively report ambulation tolerance >/= 30 minutes to enable return back to walking regime for exercise. Recert: Progressing: Ambulation tolerance = 20 mins. Current: MET: ambulation tolerance = 30 mins.  10/24/22       PLAN  []  Upgrade activities as tolerated     [x]  Continue plan of care  []  Update interventions per flow sheet       []  Discharge due to:_  []  Other:_      Juan Young, LU 10/24/2022  10:52 AM    Future Appointments   Date Time Provider Rosamaria Ross   10/24/2022 11:45 AM Thsofía Footchris, PTA [facility]   10/26/2022  2:45 PM José Miguel Rodriguez, PTA [facility]   10/31/2022 12:30 PM Thurl Footman, PTA [facility]   11/2/2022  2:00 PM Thurl Footman, PTA [facility]

## 2022-10-26 ENCOUNTER — HOSPITAL ENCOUNTER (OUTPATIENT)
Dept: PHYSICAL THERAPY | Age: 77
Discharge: HOME | End: 2022-10-26
Attending: PHYSICAL MEDICINE & REHABILITATION
Payer: MEDICARE

## 2022-10-26 PROCEDURE — 97112 NEUROMUSCULAR REEDUCATION: CPT

## 2022-10-26 PROCEDURE — 97110 THERAPEUTIC EXERCISES: CPT

## 2022-10-26 NOTE — PROGRESS NOTES
PT DAILY TREATMENT NOTE     Patient Name: Mark Lui  Date:10/26/2022  : 1945  [x]  Patient  Verified  Payor: Dahiana Weston / Plan: [place] MEDICARE PART A & B / Product Type: Medicare /    In time:2:45  Out time:3:33  Total Treatment Time (min): 48  Visit #: 4 of 8    Medicare/BCBS Only   Total Timed Codes (min):  38 1:1 Treatment Time:  38       Treatment Area: Other low back pain [M54.59]    SUBJECTIVE  Pain Level (0-10 scale): 2-3  Any medication changes, allergies to medications, adverse drug reactions, diagnosis change, or new procedure performed?: [x] No    [] Yes (see summary sheet for update)  Subjective functional status/changes:   [] No changes reported  Patient reports she is not having the numbness in her hip anymore. OBJECTIVE                 Modality rationale: decrease inflammation and decrease pain to improve the patients ability to increase tolerance to activity.      Min Type Additional Details     10 [x]  Ice     []  heat  []  Ice massage  []  Laser   []  Anodyne Position:left side lying  Location: right hip     [] Skin assessment post-treatment:  []intact []redness- no adverse reaction    []redness - adverse reaction:      15 min Therapeutic Exercise:  [x] See flow sheet :  Emphasis placed on improving available lumbopelvic and LE AROM and strength   Rationale: increase ROM and increase strength to improve the patients ability to improve ease with community ADLs        23 min Neuromuscular Re-education:  [x]  See flow sheet : Emphasis placed on improving activation and recruitment of the gluteal musculature and improving LE proprioceptive and kinesthetic awareness and static and dynamic balance   Rationale: increase ROM, increase strength, improve balance, and increase proprioception  to improve the patients ability to decrease falls risk        With   [] TE   [] TA   [] neuro   [] other: Patient Education: [x] Review HEP    [] Progressed/Changed HEP based on:   [] positioning   [] body mechanics   [] transfers   [] heat/ice application    [] other:      Other Objective/Functional Measures: unable to SLS on right LE without HHA. Pain Level (0-10 scale) post treatment: 0    ASSESSMENT/Changes in Function: Patient was able to tolerate weightbearing exercises without being limited by left knee pain. Limited with being able to progress patient due to left knee pain. Patient will continue to benefit from skilled PT services to modify and progress therapeutic interventions, address functional mobility deficits, address ROM deficits, address strength deficits, analyze and address soft tissue restrictions, analyze and cue movement patterns, analyze and modify body mechanics/ergonomics, assess and modify postural abnormalities, address imbalance/dizziness, and instruct in home and community integration to attain remaining goals. []  See Plan of Care  []  See progress note/recertification  []  See Discharge Summary         Progress towards goals / Updated goals:  Goals for this certification period to be accomplished in 4 weeks:  1. Patient will demonstrate right hip abduction MMT 5/5 to improve safety with ambulation on uneven surfaces. Recert: Progressing: right hip abduction MMT = 4+/5.   2. Patient will demonstrate right SLS >/= 20 seconds without pain to improve ease with stair management. Recert:  Not tested per patient discretion secondary to acute knee pain reported  Current: Tolerates 30\" SLS B with use of 1 UE 10/20/22  3. Patient will demonstrate a significant functional improvement as demonstrated by a score of >/= 62 on FOTO. Recert: Progressing: FOTO = 57.        4. Patient will subjectively report ambulation tolerance >/= 30 minutes to enable return back to walking regime for exercise. Recert: Progressing: Ambulation tolerance = 20 mins. Current: MET: ambulation tolerance = 30 mins.  10/24/22          PLAN  []  Upgrade activities as tolerated     [x] Continue plan of care  []  Update interventions per flow sheet       []  Discharge due to:_  []  Other:_      Juan Young PTA 10/26/2022  2:38 PM    Future Appointments   Date Time Provider Rosamaria Ross   10/26/2022  2:45 PM José Miguel Rodriguez [place] [facility]   10/31/2022 12:30 PM [facility]   11/2/2022  2:00 PM [facility]

## 2022-10-31 ENCOUNTER — HOSPITAL ENCOUNTER (OUTPATIENT)
Dept: PHYSICAL THERAPY | Age: 77
Discharge: HOME | End: 2022-10-31
Attending: PHYSICAL MEDICINE & REHABILITATION
Payer: MEDICARE

## 2022-10-31 PROCEDURE — 97112 NEUROMUSCULAR REEDUCATION: CPT

## 2022-10-31 PROCEDURE — 97110 THERAPEUTIC EXERCISES: CPT

## 2022-10-31 NOTE — PROGRESS NOTES
PT DAILY TREATMENT NOTE     Patient Name: Tom Walker  Date:10/31/2022  : 1945  [x]  Patient  Verified  Payor: Davon Del Real / Plan: [place] MEDICARE PART A & B / Product Type: Medicare /    In time:12:21  Out time:1:10  Total Treatment Time (min): 49  Visit #: 5 of 8    Medicare/BCBS Only   Total Timed Codes (min):  39 1:1 Treatment Time:  39       Treatment Area: Other low back pain [M54.59]    SUBJECTIVE  Pain Level (0-10 scale): 1  Any medication changes, allergies to medications, adverse drug reactions, diagnosis change, or new procedure performed?: [x] No    [] Yes (see summary sheet for update)  Subjective functional status/changes:   [] No changes reported  Patient states that she has been feeling pretty good for the last couple of days. OBJECTIVE                     Modality rationale: decrease inflammation and decrease pain to improve the patients ability to increase tolerance to activity.      Min Type Additional Details     10 [x]  Ice     []  heat  []  Ice massage  []  Laser   []  Anodyne Position:left side lying  Location: right hip     [] Skin assessment post-treatment:  []intact []redness- no adverse reaction    []redness - adverse reaction:      16 min Therapeutic Exercise:  [x] See flow sheet :  Emphasis placed on improving available lumbopelvic and LE AROM and strength   Rationale: increase ROM and increase strength to improve the patients ability to improve ease with community ADLs        23 min Neuromuscular Re-education:  [x]  See flow sheet : Emphasis placed on improving activation and recruitment of the gluteal musculature and improving LE proprioceptive and kinesthetic awareness and static and dynamic balance   Rationale: increase ROM, increase strength, improve balance, and increase proprioception  to improve the patients ability to decrease falls risk             With   [] TE   [] TA   [] neuro   [] other: Patient Education: [x] Review HEP    [] Progressed/Changed HEP based on:   [] positioning   [] body mechanics   [] transfers   [] heat/ice application    [] other:      Other Objective/Functional Measures: right hip abduction MMT = 4+/5. Pain Level (0-10 scale) post treatment: 0    ASSESSMENT/Changes in Function: Patient plans to discharge after next scheduled appointment. Provided patient with updated HEP. Patient will continue to benefit from skilled PT services to modify and progress therapeutic interventions, address functional mobility deficits, address ROM deficits, address strength deficits, analyze and address soft tissue restrictions, analyze and cue movement patterns, analyze and modify body mechanics/ergonomics, assess and modify postural abnormalities, address imbalance/dizziness, and instruct in home and community integration to attain remaining goals. []  See Plan of Care  []  See progress note/recertification  []  See Discharge Summary         Progress towards goals / Updated goals:  Goals for this certification period to be accomplished in 4 weeks:  1. Patient will demonstrate right hip abduction MMT 5/5 to improve safety with ambulation on uneven surfaces. Recert: Progressing: right hip abduction MMT = 4+/5. Current: Remains: right hip abduction MMT = 4+/5. 10/31/22   2. Patient will demonstrate right SLS >/= 20 seconds without pain to improve ease with stair management. Recert:  Not tested per patient discretion secondary to acute knee pain reported  Current: remains: Tolerates 30\" SLS B with use of 1 UE 10/31/22  3. Patient will demonstrate a significant functional improvement as demonstrated by a score of >/= 62 on FOTO. Recert: Progressing: FOTO = 57.        4. Patient will subjectively report ambulation tolerance >/= 30 minutes to enable return back to walking regime for exercise. Recert: Progressing: Ambulation tolerance = 20 mins. Current: MET: ambulation tolerance = 30 mins.  10/24/22       PLAN  []  Upgrade activities as tolerated [x]  Continue plan of care  []  Update interventions per flow sheet       []  Discharge due to:_  []  Other:_      Juan Young PTA 10/31/2022  12:24 PM    Future Appointments   Date Time Provider Rosamaria Ross   10/31/2022 12:30 PM [facility]   11/2/2022  2:00 PM LU Fernandez [facility]

## 2022-11-02 ENCOUNTER — HOSPITAL ENCOUNTER (OUTPATIENT)
Dept: PHYSICAL THERAPY | Age: 77
Discharge: HOME | End: 2022-11-02
Attending: PHYSICAL MEDICINE & REHABILITATION
Payer: MEDICARE

## 2022-11-02 PROCEDURE — 97110 THERAPEUTIC EXERCISES: CPT

## 2022-11-02 PROCEDURE — 97112 NEUROMUSCULAR REEDUCATION: CPT

## 2022-11-02 NOTE — PROGRESS NOTES
Physical Therapy Discharge Instructions      [facility] - [facility]   [address], [address]   [phone] fax    Patient: Chin Gomes  : 1945      Continue Home Exercise Program 1 time per day      Continue with    [x] Ice  as needed      [x] Heat           Follow up with MD:     [] Upon completion of therapy     [x] As needed      Recommendations:     [x]   Return to activity with home program    []   Return to activity with the following modifications:       []Post Rehab Program    []Join Independent aquatic program     []Return to/join local gym        Candido Holliday PTA 2022 2:41 PM

## 2022-11-02 NOTE — PROGRESS NOTES
PT DAILY TREATMENT NOTE     Patient Name: Alexa Tejeda  Date:2022  : 1945  [x]  Patient  Verified  Payor: Luz Greene / Plan: [place] MEDICARE PART A & B / Product Type: Medicare /    In time:2:02  Out time:2:48  Total Treatment Time (min): 46  Visit #: 6 of 8    Medicare/BCBS Only   Total Timed Codes (min):  46 1:1 Treatment Time:  46       Treatment Area: Other low back pain [M54.59]    SUBJECTIVE  Pain Level (0-10 scale): 0  Any medication changes, allergies to medications, adverse drug reactions, diagnosis change, or new procedure performed?: [x] No    [] Yes (see summary sheet for update)  Subjective functional status/changes:   [] No changes reported  Patient reports the updated home exercises program has been going well. Patient states today she has not been having any pain. OBJECTIVE       23 min Therapeutic Exercise:  [x] See flow sheet :  Emphasis placed on improving available lumbopelvic and LE AROM and strength   Rationale: increase ROM and increase strength to improve the patients ability to improve ease with community ADLs        23 min Neuromuscular Re-education:  [x]  See flow sheet : Emphasis placed on improving activation and recruitment of the gluteal musculature and improving LE proprioceptive and kinesthetic awareness and static and dynamic balance   Rationale: increase ROM, increase strength, improve balance, and increase proprioception  to improve the patients ability to decrease falls risk             With   [] TE   [] TA   [] neuro   [] other: Patient Education: [x] Review HEP    [] Progressed/Changed HEP based on:   [] positioning   [] body mechanics   [] transfers   [] heat/ice application    [] other:      Other Objective/Functional Measures: see goals      Pain Level (0-10 scale) post treatment: 0    ASSESSMENT/Changes in Function: Patient has improved with ambulation tolerance and overall pain management.  Patient provided with updated home exercises to continue to progress strengthening. Patient is limited with being progressed due to left knee pain. []  See Plan of Care  []  See progress note/recertification  [x]  See Discharge Summary         Progress towards goals / Updated goals:  Goals for this certification period to be accomplished in 4 weeks:  1. Patient will demonstrate right hip abduction MMT 5/5 to improve safety with ambulation on uneven surfaces. Recert: Progressing: right hip abduction MMT = 4+/5. Current: Remains: right hip abduction MMT = 4+/5. 10/31/22   2. Patient will demonstrate right SLS >/= 20 seconds without pain to improve ease with stair management. Recert:  Not tested per patient discretion secondary to acute knee pain reported  Current: remains: Tolerates 30\" SLS B with use of 1 UE 10/31/22  3. Patient will demonstrate a significant functional improvement as demonstrated by a score of >/= 62 on FOTO. Recert: Progressing: FOTO = 57. Current: MET: FOTO =71. 11/2/22        4. Patient will subjectively report ambulation tolerance >/= 30 minutes to enable return back to walking regime for exercise. Recert: Progressing: Ambulation tolerance = 20 mins. Current: MET: ambulation tolerance = 30 mins. 10/24/22       PLAN  []  Upgrade activities as tolerated     []  Continue plan of care  []  Update interventions per flow sheet       []  Discharge due to:_  []  Other:_      Donnell Pina, PTA 11/2/2022  2:09 PM    No future appointments.

## 2022-11-03 NOTE — THERAPY DISCHARGE
[facility] - [facility]              [address]        [place], [address] [place]   [phone] fax    Discharge Summary  Patient name: Fly Alberto Start of Care: 2022   Referral source: Mayra Coats MD : 1945   Medical/Treatment Diagnosis: Other low back pain [M54.59]  Payor: Yonas Mckenzie / Plan: [place] MEDICARE PART A & B / Product Type: Medicare /  Onset Date:2022     Prior Hospitalization: see medical history Provider#: [id number]   Medications: Verified on Patient Summary List    Comorbidities: HTN, BMI>30, GERD, Anxiety, Arthritis    Prior Level of Function: Retired, Walking for Exercise (3x/day; 20-30 minutes), Gym Regime, Walking without AD, No Fall History, (I) Functional ADLs, (I) Self-Care ADLs, (I) Driving    Visits from Start of Care: 16    Missed Visits: 1  Reporting Period : 10/12/2022 to 2022    Summary of Care:  1. Patient will demonstrate right hip abduction MMT 5/5 to improve safety with ambulation on uneven surfaces. Recert: Progressing: right hip abduction MMT = 4+/5. Current: Remains: right hip abduction MMT = 4+/5. 10/31/22   2. Patient will demonstrate right SLS >/= 20 seconds without pain to improve ease with stair management. Recert:  Not tested per patient discretion secondary to acute knee pain reported  Current: remains: Tolerates 30\" SLS B with use of 1 UE 10/31/22  3. Patient will demonstrate a significant functional improvement as demonstrated by a score of >/= 62 on FOTO. Recert: Progressing: FOTO = 57. Current: MET: FOTO =71. 22        4. Patient will subjectively report ambulation tolerance >/= 30 minutes to enable return back to walking regime for exercise. Recert: Progressing: Ambulation tolerance = 20 mins. Current: MET: ambulation tolerance = 30 mins.  10/24/22      ASSESSMENT/RECOMMENDATIONS:  [x]Discontinue therapy: [x]Patient has reached or is progressing toward set goals      []Patient is non-compliant or has abdicated      []Due to lack of appreciable progress towards set goals    Deedee Powell, PT 11/3/2022 3:29 PM

## 2022-12-15 ENCOUNTER — TELEPHONE (OUTPATIENT)
Dept: ORTHOPEDIC SURGERY | Age: 77
End: 2022-12-15

## 2022-12-15 NOTE — TELEPHONE ENCOUNTER
Dr Heather Marquez called regarding this mutual pt of hers and Dr Sudhakar Arellano. Pt last seen here in Sept and was doing ok, but is now having terrible back and leg pain. She has  known herniated disc. She tried to call for an appt, but was told Feb was the earliest with Dr Sudhakar Arellano. Dr. Heather Marquez is requesting pt to be seen as soon as possible as the MDP she gave her on 12/8/22 did not help at all.

## 2022-12-15 NOTE — TELEPHONE ENCOUNTER
I called and spoke to the pt. The pt was identified using 2 pt identifiers. She was offered an appt on 12/19/22 at 1100. The pt accepted the appt. She is aware of the office location. No questions or concerns voiced at this time.

## 2022-12-19 ENCOUNTER — OFFICE VISIT (OUTPATIENT)
Dept: ORTHOPEDIC SURGERY | Age: 77
End: 2022-12-19
Payer: MEDICARE

## 2022-12-19 VITALS
SYSTOLIC BLOOD PRESSURE: 138 MMHG | RESPIRATION RATE: 18 BRPM | BODY MASS INDEX: 36.67 KG/M2 | OXYGEN SATURATION: 99 % | HEART RATE: 72 BPM | WEIGHT: 214.8 LBS | TEMPERATURE: 97.6 F | HEIGHT: 64 IN | DIASTOLIC BLOOD PRESSURE: 78 MMHG

## 2022-12-19 DIAGNOSIS — M54.31 RIGHT SIDED SCIATICA: Primary | ICD-10-CM

## 2022-12-19 RX ORDER — METHYLPREDNISOLONE 4 MG/1
TABLET ORAL
COMMUNITY
Start: 2022-12-08 | End: 2022-12-19

## 2022-12-19 RX ORDER — NIRMATRELVIR AND RITONAVIR 300-100 MG
KIT ORAL
COMMUNITY
Start: 2022-09-27

## 2022-12-19 RX ORDER — KETOROLAC TROMETHAMINE 30 MG/ML
30 INJECTION, SOLUTION INTRAMUSCULAR; INTRAVENOUS
Status: COMPLETED | OUTPATIENT
Start: 2022-12-19 | End: 2022-12-19

## 2022-12-19 RX ORDER — PREGABALIN 50 MG/1
CAPSULE ORAL
Qty: 90 CAPSULE | Refills: 0 | Status: SHIPPED | OUTPATIENT
Start: 2022-12-19

## 2022-12-19 RX ADMIN — KETOROLAC TROMETHAMINE 30 MG: 30 INJECTION, SOLUTION INTRAMUSCULAR; INTRAVENOUS at 11:47

## 2022-12-19 NOTE — PROGRESS NOTES
Consent was explained to the pt and signed. No questions or concerns voiced at this time. Pt given 30mg/1ml of toradol IM in right gluteus. No sign or symptoms of infection noted at injection site. There was no bleeding, swelling or leaking noted after injection. Pt handed injection information sheet to take home. Ms. Ludivina Echavarria tolerated the injection well. She states that she will walk to check out and see how she feels. If she feels fine she will leave after checking out. The pt ambulated to check out with out any issues.

## 2022-12-19 NOTE — PROGRESS NOTES
[address]  [place]  Phone: [phone]  Fax: [phone]        Chilo Erwin  : 1945  PCP: Festus Lezama,     PROGRESS NOTE      ASSESSMENT AND PLAN    Diagnoses and all orders for this visit:    1. Right sided sciatica  -     pregabalin (Lyrica) 50 mg capsule; One po bid-tid for nerve pain      David Mittal is a 64 y.o. female with a history of right L5-S1 disc herniation. She had been doing well until 3 weeks ago when she lifted a [employer] plant. She has had partial benefit with oral corticosteroids but continues to require tramadol. On exam she has no back pain and no nerve tension signs. Good strength. Toradol injection 30 mg IM x1 now. Resume Lyrica low-dose 50 mg twice daily to 3 times daily. If symptoms do not improve, then injection. I am setting her up to have an ultrasound-guided injection of her sciatic nerve by Dr. Jono Lee. Follow-up and Dispositions    Return Dr. Remington Michael for left sciatic injection. Administrations This Visit       ketorolac (TORADOL) injection 30 mg       Admin Date  2022  11:47 Action  Given Dose  30 mg Route  IntraMUSCular Site  Right Gluteus Juan M Administered By  Cesia Cali LPN    NDC: 34252-552-39    Patient Supplied?: No                     HISTORY OF PRESENT ILLNESS      David Mittal is a 64 y.o. female presents for follow up of back pain. Patient states that she was getting dylan decorations out of the storage unit 3 weeks ago and pulled a heavy plant down the hallway, causing her sciatic pain to hurt. She took her last dose of steroids on 2022 and stated that they did provide some relief . She states that she is taking Tramadol q8h  She reports that her pain is a burning sensation that radiates down her right leg to ankle. She denies low back pain, hip pain, and left extremity pain.    She denies taking Lyrica currently. She states that she has not been doing her stretches at home due to fear of making her pain worse. Denies loss of control of bladder/bowel, recent abx. or infections, fever/chills, no hx. PUD    Pain Assessment  12/19/2022   Location of Pain Hip   Location Modifiers Right   Severity of Pain 8   Quality of Pain -   Quality of Pain Comment -   Duration of Pain Persistent   Frequency of Pain Constant   Aggravating Factors Walking; Other (Comment)   Aggravating Factors Comment sitting   Limiting Behavior Yes   Relieving Factors Ice   Relieving Factors Comment -   Result of Injury No         Onset of pain: 7/2022        Investigations:   L MRI 8/2022: right L5-S1 disc extrusion, multiple degenerative changes  Spine surgery consult: none     Treatments:  Physical therapy: 8/2022 with benefit, 11/2022 , great benefit  Spinal injections: no  Spinal surgery- no  Beneficial medications: Tramadol, Tylenol, Flexeril  Failed medications: MDP x 2     Work Status: retired  Pertinent PMHx:  GERD, HTN, HLD.  8/2022 GFR 55    PHYSICAL EXAMINATION    Visit Vitals  /78 (BP 1 Location: Right arm, BP Patient Position: Sitting, BP Cuff Size: Adult)   Pulse 72   Temp 97.6 °F (36.4 °C) (Temporal)   Resp 18   Ht 5' 4\" (1.626 m)   Wt 214 lb 12.8 oz (97.4 kg)   SpO2 99% Comment: RA   BMI 36.87 kg/m²       Tender at right sciatic notch   LE strength intact   SLR negative   No edema                   Written by Shay Olivera, as dictated by Anna Spicer MD.

## 2022-12-19 NOTE — PROGRESS NOTES
Maddy Martinez presents today for   Chief Complaint   Patient presents with    Hip Pain     Right         Is someone accompanying this pt? no    Is the patient using any DME equipment during OV? no    Depression Screening:  No flowsheet data found. Learning Assessment:  Learning Assessment 8/22/2022   PRIMARY LEARNER Patient   PRIMARY LANGUAGE ENGLISH   LEARNER PREFERENCE PRIMARY READING     DEMONSTRATION   ANSWERED BY patient   RELATIONSHIP SELF       Abuse Screening:  Abuse Screening Questionnaire 8/22/2022   Do you ever feel afraid of your partner? N   Are you in a relationship with someone who physically or mentally threatens you? N   Is it safe for you to go home? Y       Fall Risk  Fall Risk Assessment, last 12 mths 12/19/2022   Able to walk? Yes   Fall in past 12 months? 0   Do you feel unsteady? 0   Are you worried about falling 0       OPIOID RISK TOOL  No flowsheet data found. Coordination of Care:  1. Have you been to the ER, urgent care clinic since your last visit? no  Hospitalized since your last visit? no    2. Have you seen or consulted any other health care providers outside of the [address] [place] [address] since your last visit? no Include any pap smears or colon screening.  no

## 2023-01-30 ENCOUNTER — CLINICAL SUPPORT (OUTPATIENT)
Dept: ORTHOPEDIC SURGERY | Age: 78
End: 2023-01-30
Payer: MEDICARE

## 2023-01-30 VITALS
OXYGEN SATURATION: 94 % | WEIGHT: 220 LBS | TEMPERATURE: 97.1 F | RESPIRATION RATE: 16 BRPM | BODY MASS INDEX: 37.56 KG/M2 | HEIGHT: 64 IN | DIASTOLIC BLOOD PRESSURE: 76 MMHG | SYSTOLIC BLOOD PRESSURE: 136 MMHG | HEART RATE: 72 BPM

## 2023-01-30 DIAGNOSIS — M54.17 RIGHT LUMBOSACRAL RADICULOPATHY: ICD-10-CM

## 2023-01-30 DIAGNOSIS — M54.31 RIGHT SIDED SCIATICA: Primary | ICD-10-CM

## 2023-01-30 PROCEDURE — 99214 OFFICE O/P EST MOD 30 MIN: CPT | Performed by: PHYSICAL MEDICINE & REHABILITATION

## 2023-01-30 PROCEDURE — 1123F ACP DISCUSS/DSCN MKR DOCD: CPT | Performed by: PHYSICAL MEDICINE & REHABILITATION

## 2023-01-30 RX ORDER — MELOXICAM 15 MG/1
TABLET ORAL
COMMUNITY
Start: 2023-01-12

## 2023-01-30 NOTE — PROGRESS NOTES
[address], [address]  [address]  Phone: [phone]  Fax: [phone]      Patient: Anthony Stanton                                                                              MRN: [id number]        YOB: 1945          AGE: 76 y.o. PCP: Marylin Kam,   Date:  01/30/23    Reason for Consultation: Confirmation for Procedure(s)      HPI:  Anthony Stanton is a 76 y.o. female with relevant PMH of HTN, HLD, GERD  who presents with low back pain radiating down the right leg.  8/2022 she had a right L5/S1 disc protrusion with contact on the right S1 nerve in lateral recess. She did a course of PT and her pain improved. 11/2022 she was pulling a heavy potted plant and she felt pain in her right leg with numbness and tingling. She stared lyrica 50mg which has been helpful and today her pain is manageable. She was initially referred by Dr Bettye Kapoor for a sciatic nerve injection. Neurologic symptoms: No numbness, tingling, weakness, bowel or bladder changes. No recent falls      Location: The pain is located in the right gluteal    Radiation: The pain does radiate down right leg with numbness . Pain Score: Currently: 2/10   Quality: Pain is of a Achy and Tingling quality. Aggravating: Pain is exacerbated by sitting  Alleviating: The pain is alleviated by walking    Prior Treatments:  Physical therapy: YES 9/2022  Injections:NO    Previous Medications: tramadol   Current Medications: cymbalta 20mg, lyrica 50mg , meloxicam   Previous work-up has included:   MRI lumbar spine 8/12/22  T10/11: At edge of field-of-view of imaging. Mild disc protrusion indenting into the ventral thecal sac, without limiting central or foraminal stenosis. T11/12, T12/L1: No significant disc protrusion or limiting stenosis is seen. Mild facet and ligamentous hypertrophy indenting into the dorsal thecal sac. Silvio Mcelroy      L1/2: Mild disc bulge and slight ventral impression on thecal sac. Mild facet and ligamentous hypertrophy. No limiting central or foraminal stenosis. L2/3: Disc space narrowing, broad-based disc/osteophyte, indenting into the ventral thecal sac and inferior foramen. Moderate facet arthropathy and small bilateral facet joint effusions. Mild central stenosis (0.9 cm) and mild bilateral inferior foraminal narrowing. L3/4: Mild central and foraminal disc/osteophyte, indenting the ventral thecal sac and inferior foramen. Moderate facet and ligamentous hypertrophy and small right more than left facet joint effusions. No limiting central stenosis. Mild left and minimal right inferior foraminal narrowing. L4/5: Mild central and foraminal disc/osteophyte and facet arthropathy. No limiting central stenosis. Mild left more than right inferior foraminal stenosis. L5/S1: Broad-based disc protrusion with right paracentral extension, approximately 1.3 cm caudal to the disc space. This displaces the right S1 root as it approaches lateral recess. There is no contact or compromise of the foraminal right L5 root. No limiting central or foraminal narrowing. Marked facet arthropathy, worse on the right. Past Medical History:   Past Medical History:   Diagnosis Date    GERD (gastroesophageal reflux disease)     High cholesterol     Hypertension       Past Surgical History:   Past Surgical History:   Procedure Laterality Date    COLONOSCOPY N/A 8/16/2018    COLONOSCOPY performed by Yuliet Bruno MD at [facility]    HX CHOLECYSTECTOMY      HX COLONOSCOPY      HX HEMORRHOIDECTOMY      HX HYSTERECTOMY        SocHx:   Social History     Tobacco Use    Smoking status: Never    Smokeless tobacco: Never   Substance Use Topics    Alcohol use: No      FamHx:? History reviewed. No pertinent family history.     Current Medications:    Current Outpatient Medications   Medication Sig Dispense Refill    pregabalin (Lyrica) 50 mg capsule One po bid-tid for nerve pain 90 Capsule 0    DULoxetine (CYMBALTA) 20 mg capsule Take 1 Capsule by mouth daily. 30 Capsule 0    amLODIPine (NORVASC) 2.5 mg tablet Take 2.5 mg by mouth daily. ezetimibe (ZETIA) 10 mg tablet Take 10 mg by mouth daily. fluticasone propionate (FLONASE) 50 mcg/actuation nasal spray 2 Sprays by Both Nostrils route daily as needed. hydroCHLOROthiazide (HYDRODIURIL) 25 mg tablet Take 25 mg by mouth daily. magnesium 250 mg tab Take 250 mg by mouth daily. traMADoL (ULTRAM) 50 mg tablet TAKE 1 TABLET BY MOUTH EVERY 8 HOURS FOR 7 DAYS AS NEEDED FOR PAIN      turmeric-turmeric root extract 450-50 mg cap Take 500 mg by mouth daily. ALPRAZolam (XANAX) 0.25 mg tablet Take 0.25 mg by mouth daily. losartan (COZAAR) 50 mg tablet Take 100 mg by mouth nightly. cetirizine (ZYRTEC) 10 mg tablet Take  by mouth daily. meloxicam (MOBIC) 15 mg tablet TAKE 1 TABLET BY MOUTH EVERY 24 HOURS WITH FOOD      Paxlovid, EUA, 300 mg (150 mg x 2)-100 mg TAKE TABLETS BY MOUTH AS DIRECTED ON PACKAGE (Patient not taking: Reported on 1/30/2023)        Allergies:     Allergies   Allergen Reactions    Atorvastatin Unknown (comments)    Buspirone Hcl Vertigo    Escitalopram Oxalate Rash    Fluoxetine Unknown (comments)    Fluvastatin Other (comments)    Lisinopril Cough    Moxifloxacin Unknown (comments)    Rosuvastatin Unknown (comments)    Sertraline Hcl Anxiety    Simvastatin Unknown (comments)    Sulfa (Sulfonamide Antibiotics) Other (comments)     Constipation        Review of Systems:   Gen:    Denied fevers, chills, malaise, fatigue, weight changes   Resp: Denied shortness of breath, cough, wheezing   CVS: Denied chest pain, palpitations   : Denied urinary urgency, frequency, incontinence   GI: Denied nausea, vomiting, constipation, diarrhea   Skin: Denied rashes, wounds   Psych: Denied anxiety, depression   Vasc: Denied claudication, ulcers   Hem: Denied easy bruising/bleeding MSK: See HPI   Neuro: See HPI         Physical Exam     Vital Signs: Visit Vitals  /76 (BP 1 Location: Left upper arm, BP Patient Position: Sitting, BP Cuff Size: Adult long)   Pulse 72   Temp 97.1 °F (36.2 °C) (Temporal)   Resp 16   Ht 5' 4\" (1.626 m)   Wt 220 lb (99.8 kg)   SpO2 94%   BMI 37.76 kg/m²      General: ??????? Well nourished and well developed female without any acute distress   Psychiatric: ?  Alert and oriented x 3 with normal mood    HEENT: ???????? Atraumatic   Respiratory:   Breathing non-labored and non dyspneic   CV: ???????????????? Peripheral pulses intact, no peripheral edema   Skin: ????????????? No rashes       Neurologic: ?? Sensation: normal and grossly intact thebilateral, lower extremity(s) except diminished right heel    Strength: 5/5 in the bilateral, lower extremity(s)   Reflexes: reveals 2+ symmetric DTRs throughout patella absent b/l achilles   Gait: normal     Musculoskeletal: Lumbar Exam     Inspection:   Alignment: Normal  Atrophy: None     Tenderness to Palpation:   Lumbar paraspinals Positive  Lumbar spinous processes Negative  SI Joint:  Negative  Gluteal:Positive  right       ROM:   Lumbar ROM: Abnormal mild pain with flexion and extension   Lumbar facet loading: Negative  Hip ROM: No reproduction of pain with movement     Special Tests      Slump test: Negative            Medical Decision Making:    Images: The imaging results as well as the actual images of the studies below were reviewed, visualized and interpreted by me. Labs: The results below were reviewed. MRI lumar spine 8/12/22- L5/S1 disc bulge with right paracentral protrusion with S1 nerve displacement      Assessment:   -right S1 radiculopathy vs sciatica     Plan:      -Physical therapy -  continue HEP, discussed activities to avoid, bending, lifting, twisting   -Medications - discussed current medications. She is going to d/c meloxicam while she is taking ASA 81mg .  Counseled regarding side effects and appropriate administration of medications.    -Diagnostics/Imaging - Reviewed MRI lumbar spine 8/2022   -Injections - If pain returns discussed possible sciatic nerve hydrodissection vs lumbar STAR. She is hesitant to try spine injection    -Lifestyle - Discussed activity modification    -Education - The patient's diagnosis, prognosis and treatment options were discussed today. All questions were answered. F/U - if pain returns         [facility]        Total time spent with patient:  30 mins for today's visit was devoted to face-to-face counseling regarding the following:  Discussed diagnosis, treatment options, and risks and benefits of treatment          ?

## 2023-01-30 NOTE — PROGRESS NOTES
Chief Complaint   Patient presents with    Confirmation for Procedure(s)       Pt preferred language for health care discussion is english. Is someone accompanying this pt? no    Is the patient using any DME equipment during OV? no    Depression Screening:  No flowsheet data found. Learning Assessment:  Learning Assessment 8/22/2022   PRIMARY LEARNER Patient   PRIMARY LANGUAGE ENGLISH   LEARNER PREFERENCE PRIMARY READING     DEMONSTRATION   ANSWERED BY patient   RELATIONSHIP SELF       Abuse Screening:  Abuse Screening Questionnaire 8/22/2022   Do you ever feel afraid of your partner? N   Are you in a relationship with someone who physically or mentally threatens you? N   Is it safe for you to go home? Y       Fall Risk  Fall Risk Assessment, last 12 mths 12/19/2022   Able to walk? Yes   Fall in past 12 months? 0   Do you feel unsteady? 0   Are you worried about falling 0           Advance Directive:  1. Do you have an advance directive in place? Patient Reply:no    2. If not, would you like material regarding how to put one in place? Patient Reply: no    2. Per patient no changes to their ACP contact no. Coordination of Care:  1. Have you been to the ER, urgent care clinic since your last visit? Hospitalized since your last visit? no    2. Have you seen or consulted any other health care providers outside of the [address] [place] [address] since your last visit? Include any pap smears or colon screening.  no

## 2023-02-09 ENCOUNTER — OFFICE VISIT (OUTPATIENT)
Dept: ORTHOPEDIC SURGERY | Age: 78
End: 2023-02-09
Payer: MEDICARE

## 2023-02-09 VITALS
BODY MASS INDEX: 37.56 KG/M2 | OXYGEN SATURATION: 95 % | SYSTOLIC BLOOD PRESSURE: 135 MMHG | WEIGHT: 220 LBS | DIASTOLIC BLOOD PRESSURE: 75 MMHG | RESPIRATION RATE: 18 BRPM | HEART RATE: 70 BPM | HEIGHT: 64 IN | TEMPERATURE: 97.6 F

## 2023-02-09 DIAGNOSIS — M51.26 HNP (HERNIATED NUCLEUS PULPOSUS), LUMBAR: ICD-10-CM

## 2023-02-09 DIAGNOSIS — M54.31 RIGHT SIDED SCIATICA: Primary | ICD-10-CM

## 2023-02-09 RX ORDER — PREGABALIN 50 MG/1
CAPSULE ORAL
Qty: 60 CAPSULE | Refills: 5 | Status: SHIPPED | OUTPATIENT
Start: 2023-02-09

## 2023-02-09 NOTE — PROGRESS NOTES
[address], [address]  [place], [address] [place] [address] Street  Phone: [phone]  Fax: [phone]        Min Padilla  : 1945  PCP: Napoleon Lin DO    PROGRESS NOTE      ASSESSMENT AND PLAN    Diagnoses and all orders for this visit:    1. Right sided sciatica  -     pregabalin (Lyrica) 50 mg capsule; One po bid- for nerve pain    2. HNP (herniated nucleus pulposus), lumbar      Pauline Fairchild is a 76 y.o. female with right lumbosacral radicular pain responding to low-dose Lyrica. She did not require a sciatic nerve injection. .   RF routine Lyrica 50mg daily, may increase to twice daily for increased pain. Advised to continue activity as tolerated. Follow-up and Dispositions    Return if symptoms worsen or fail to improve. HISTORY OF PRESENT ILLNESS      Pauline Fairchild is a 76 y.o. female presents for follow up of back pain. LV resume Lyrica 50 mg BID-TID, given Toradol 30 mg injection. Referred to Dr. Aura Jones for injection    By the time she saw Dr. Rufina Cazares, her pain had improved. She feels that Lyrica is effective. She reports intermittent low back pain and right hip pain. She states her pain is improving. Her hip pain is exacerbated with sitting. Her standing and walking is unaffected. Pt notes occasional numbness and tingling and in RLE. She takes Lyrica 50 mg once a day with benefit. Denies side effects.     Pain Assessment  2023   Location of Pain Back   Location Modifiers -   Severity of Pain 2   Quality of Pain Dull;Aching   Quality of Pain Comment -   Duration of Pain Persistent   Frequency of Pain Intermittent   Aggravating Factors Other (Comment)   Aggravating Factors Comment sitting   Limiting Behavior Some   Relieving Factors Ice   Relieving Factors Comment -   Result of Injury No         Onset of pain: 2022        Investigations:   L MRI 2022: right L5-S1 disc extrusion, multiple degenerative changes  Spine surgery consult: none     Treatments:  Physical therapy: 8/2022 with benefit, 11/2022 , great benefit  Spinal injections: no  Spinal surgery- no  Beneficial medications: Tramadol, Tylenol, Flexeril, Lyrica  Failed medications: MDP x 2     Work Status: retired  Pertinent PMHx:  GERD, HTN, HLD.  8/2022 GFR 55    PHYSICAL EXAMINATION    Visit Vitals  /75 (BP 1 Location: Right arm, BP Patient Position: Sitting, BP Cuff Size: Adult)   Pulse 70   Temp 97.6 °F (36.4 °C) (Temporal)   Resp 18   Ht 5' 4\" (1.626 m)   Wt 220 lb (99.8 kg)   SpO2 95% Comment: RA   BMI 37.76 kg/m²       LE strength intact  SLR negative  No edema  Mild TTP right sciatic notch and right trochanteric bursa.               Written by Gladys Santana, as dictated by Marlin Moore MD.

## 2023-02-09 NOTE — PROGRESS NOTES
Vika Lorenz presents today for   Chief Complaint   Patient presents with    Back Pain       Is someone accompanying this pt? no    Is the patient using any DME equipment during OV? no    Depression Screening:  No flowsheet data found. Learning Assessment:  Learning Assessment 8/22/2022   PRIMARY LEARNER Patient   PRIMARY LANGUAGE ENGLISH   LEARNER PREFERENCE PRIMARY READING     DEMONSTRATION   ANSWERED BY patient   RELATIONSHIP SELF       Abuse Screening:  Abuse Screening Questionnaire 8/22/2022   Do you ever feel afraid of your partner? N   Are you in a relationship with someone who physically or mentally threatens you? N   Is it safe for you to go home? Y       Fall Risk  Fall Risk Assessment, last 12 mths 2/9/2023   Able to walk? Yes   Fall in past 12 months? 0   Do you feel unsteady? 0   Are you worried about falling 0       OPIOID RISK TOOL  No flowsheet data found. Coordination of Care:  1. Have you been to the ER, urgent care clinic since your last visit? no  Hospitalized since your last visit? no    2. Have you seen or consulted any other health care providers outside of the [address] [place] [address] since your last visit? no Include any pap smears or colon screening.  no

## 2023-02-09 NOTE — LETTER
2/9/2023    Patient: Vika Lorenz   YOB: 1945   Date of Visit: 2/9/2023     [address] [facility] [address]  Via Fax: [phone]    Dear Joel Nix DO,      Thank you for referring Ms. Vika Lorenz to [facility] for evaluation. My notes for this consultation are attached. If you have questions, please do not hesitate to call me. I look forward to following your patient along with you.       Sincerely,    Ryan Ferrer MD

## 2023-04-17 ENCOUNTER — OFFICE VISIT (OUTPATIENT)
Age: 78
End: 2023-04-17
Payer: MEDICARE

## 2023-04-17 VITALS
TEMPERATURE: 97.6 F | WEIGHT: 219.8 LBS | HEART RATE: 71 BPM | HEIGHT: 64 IN | DIASTOLIC BLOOD PRESSURE: 85 MMHG | SYSTOLIC BLOOD PRESSURE: 144 MMHG | BODY MASS INDEX: 37.52 KG/M2 | RESPIRATION RATE: 18 BRPM | OXYGEN SATURATION: 98 %

## 2023-04-17 DIAGNOSIS — M47.816 LUMBAR SPONDYLOSIS: ICD-10-CM

## 2023-04-17 DIAGNOSIS — M54.32 LEFT SCIATIC NERVE PAIN: Primary | ICD-10-CM

## 2023-04-17 PROCEDURE — 1123F ACP DISCUSS/DSCN MKR DOCD: CPT | Performed by: PHYSICAL MEDICINE & REHABILITATION

## 2023-04-17 PROCEDURE — G8400 PT W/DXA NO RESULTS DOC: HCPCS | Performed by: PHYSICAL MEDICINE & REHABILITATION

## 2023-04-17 PROCEDURE — 1090F PRES/ABSN URINE INCON ASSESS: CPT | Performed by: PHYSICAL MEDICINE & REHABILITATION

## 2023-04-17 PROCEDURE — G8417 CALC BMI ABV UP PARAM F/U: HCPCS | Performed by: PHYSICAL MEDICINE & REHABILITATION

## 2023-04-17 PROCEDURE — 1036F TOBACCO NON-USER: CPT | Performed by: PHYSICAL MEDICINE & REHABILITATION

## 2023-04-17 PROCEDURE — 99213 OFFICE O/P EST LOW 20 MIN: CPT | Performed by: PHYSICAL MEDICINE & REHABILITATION

## 2023-04-17 PROCEDURE — 20552 NJX 1/MLT TRIGGER POINT 1/2: CPT | Performed by: PHYSICAL MEDICINE & REHABILITATION

## 2023-04-17 PROCEDURE — G8427 DOCREV CUR MEDS BY ELIG CLIN: HCPCS | Performed by: PHYSICAL MEDICINE & REHABILITATION

## 2023-04-17 RX ORDER — LOSARTAN POTASSIUM 100 MG/1
100 TABLET ORAL DAILY
COMMUNITY
Start: 2023-04-07

## 2023-04-17 RX ORDER — BETAMETHASONE SODIUM PHOSPHATE AND BETAMETHASONE ACETATE 3; 3 MG/ML; MG/ML
12 INJECTION, SUSPENSION INTRA-ARTICULAR; INTRALESIONAL; INTRAMUSCULAR; SOFT TISSUE ONCE
Status: COMPLETED | OUTPATIENT
Start: 2023-04-17 | End: 2023-04-17

## 2023-04-17 RX ORDER — MELOXICAM 15 MG/1
TABLET ORAL
COMMUNITY
Start: 2023-01-12 | End: 2023-04-17

## 2023-04-17 RX ORDER — BUPIVACAINE HYDROCHLORIDE 2.5 MG/ML
1 INJECTION, SOLUTION INFILTRATION; PERINEURAL ONCE
Status: DISCONTINUED | OUTPATIENT
Start: 2023-04-17 | End: 2023-04-17

## 2023-04-17 RX ORDER — LIDOCAINE HYDROCHLORIDE 10 MG/ML
0.5 INJECTION, SOLUTION EPIDURAL; INFILTRATION; INTRACAUDAL; PERINEURAL ONCE
Status: DISCONTINUED | OUTPATIENT
Start: 2023-04-17 | End: 2023-04-17

## 2023-04-17 RX ORDER — BACLOFEN 10 MG/1
5-10 TABLET ORAL NIGHTLY PRN
Qty: 30 TABLET | Refills: 0 | Status: SHIPPED | OUTPATIENT
Start: 2023-04-17

## 2023-04-17 RX ORDER — PREGABALIN 75 MG/1
CAPSULE ORAL
Qty: 180 CAPSULE | Refills: 1 | Status: SHIPPED | OUTPATIENT
Start: 2023-04-17 | End: 2023-07-17

## 2023-04-17 RX ORDER — LIDOCAINE HYDROCHLORIDE 10 MG/ML
0.5 INJECTION, SOLUTION INFILTRATION; PERINEURAL ONCE
Status: COMPLETED | OUTPATIENT
Start: 2023-04-17 | End: 2023-04-17

## 2023-04-17 RX ORDER — BUPIVACAINE HYDROCHLORIDE 2.5 MG/ML
0.5 INJECTION, SOLUTION INFILTRATION; PERINEURAL ONCE
Status: COMPLETED | OUTPATIENT
Start: 2023-04-17 | End: 2023-04-17

## 2023-04-17 RX ORDER — METHYLPREDNISOLONE 4 MG/1
TABLET ORAL
COMMUNITY
Start: 2023-04-13

## 2023-04-17 RX ADMIN — BUPIVACAINE HYDROCHLORIDE 1.25 MG: 2.5 INJECTION, SOLUTION INFILTRATION; PERINEURAL at 11:01

## 2023-04-17 RX ADMIN — LIDOCAINE HYDROCHLORIDE 0.5 ML: 10 INJECTION, SOLUTION INFILTRATION; PERINEURAL at 11:08

## 2023-04-17 RX ADMIN — BETAMETHASONE SODIUM PHOSPHATE AND BETAMETHASONE ACETATE 12 MG: 3; 3 INJECTION, SUSPENSION INTRA-ARTICULAR; INTRALESIONAL; INTRAMUSCULAR; SOFT TISSUE at 11:00

## 2023-04-17 NOTE — PROGRESS NOTES
Jane Ramsey presents today for   Chief Complaint   Patient presents with    Hip Pain    Pain    Back Problem    Knee Pain     right    Back Pain       Is someone accompanying this pt? no    Is the patient using any DME equipment during OV? no    Depression Screening:  No flowsheet data found. Learning Assessment:  No flowsheet data found. Abuse Screening:  No flowsheet data found. Fall Risk  No flowsheet data found. OPIOID RISK TOOL  No flowsheet data found. Coordination of Care:  1. Have you been to the ER, urgent care clinic since your last visit? no  Hospitalized since your last visit? no    2. Have you seen or consulted any other health care providers outside of the [address] [place] [address] since your last visit? no Include any pap smears or colon screening.  no
chills, antibiotics, recent cortisone injections, or infections. She did try to increase her Lyrica 50 to twice a day but this provided no benefit. She denies any side effects. AMB PAIN ASSESSMENT 4/17/2023   Location of Pain Back   Severity of Pain 9   Quality of Pain Dull;Aching   Duration of Pain Persistent   Frequency of Pain Intermittent   Aggravating Factors Walking;Standing   Limiting Behavior Yes   Relieving Factors Ice   Result of Injury No           Onset of pain: 7/2022         Investigations:   L MRI 8/2022: right L5-S1 disc extrusion, multiple degenerative changes  Spine surgery consult: none      Treatments:  Physical therapy: 8/2022 with benefit, 11/2022 , great benefit  Spinal injections: no  Spinal surgery- no  Beneficial medications: Tramadol, Tylenol, Flexeril, Lyrica  Failed medications: MDP x 2      Work Status: retired  Pertinent PMHx:  GERD, HTN, HLD.  8/2022 GFR 55    PHYSICAL EXAMINATION    BP (!) 144/85 (Site: Left Upper Arm, Position: Sitting, Cuff Size: Large Adult) Comment: pt asymptomatic. md aware  Pulse 71   Temp 97.6 °F (36.4 °C) (Temporal)   Resp 18   Ht 5' 4\" (1.626 m)   Wt 219 lb 12.8 oz (99.7 kg)   SpO2 98% Comment: RA  BMI 37.73 kg/m²     Tender L4 to sacrum, L > R SIJ, B/L trochanteric bursa   LE strength intact  SLR negative  1+ non pitting edema          OFFICE PROCEDURE PROGRESS NOTE      PROCEDURE: In the office today after informed consent using aseptic technique, the patient was injected with a total of 2 cc of 6 mg/cc Celestone, 0.5 cc each of Lidocaine and Marcaine into her left iliolumbar trigger point. Chart reviewed for the following:   IDr. Duglas, have reviewed the History, Physical and updated the Allergic reactions for Devorah Ran. Local measures (ice/heat) and medications have not alleviated the symptoms.      TIME OUT performed immediately prior to start of procedure:   Dr. Duglas HERNANDEZ, have performed the following reviews on

## 2023-04-24 ENCOUNTER — TELEPHONE (OUTPATIENT)
Age: 78
End: 2023-04-24

## 2023-04-24 NOTE — TELEPHONE ENCOUNTER
I called and spoke to the pt. The pt was identified using 2 pt identifiers. She was notified of the provider's recommendations regarding her recent concerns. The pt verbalized understanding and will stay at the night time dose for now. She is tolerating this well at this time.

## 2023-04-24 NOTE — TELEPHONE ENCOUNTER
Patient called and said that the Pregabalin 75 mg medication that Dr. Michelle Kenney placed her on is making her sick. That during the day it makes her Dizzy and Sleepy. That she is able to take it only at night. Patient said that Pam Bosch wants her to take the medication twice a day, but she is unable to take it during the day. Patient is requesting a call back with some advice. Patient tel. [phone].

## 2023-06-12 PROBLEM — M51.27 LUMBOSACRAL DISC HERNIATION: Status: ACTIVE | Noted: 2023-06-12

## 2023-10-24 ENCOUNTER — TELEPHONE (OUTPATIENT)
Age: 78
End: 2023-10-24

## 2023-10-24 NOTE — TELEPHONE ENCOUNTER
Patient is requesting a refill of 75 mg Pregabalin called in to [facility] [address]. She says this is the prescription she takes at night.

## 2023-10-24 NOTE — TELEPHONE ENCOUNTER
I called and spoke to the pt. The pt was identified using 2 pt identifiers. She was notified that she will need to have an appt in order to get a refill on the lyrica. She is already scheduled for 10/30/23 but there is availability for a virtual appt with the NP on 10/26/23. I offered the pt a sooner appt with the NP. She accepted 1140 on 10/26/23. I explained to the pt how the telephone visit works. She verbalized understanding and has no questions or concerns at this time.

## 2023-10-26 ENCOUNTER — TELEMEDICINE (OUTPATIENT)
Age: 78
End: 2023-10-26
Payer: MEDICARE

## 2023-10-26 DIAGNOSIS — M47.816 LUMBAR SPONDYLOSIS: ICD-10-CM

## 2023-10-26 DIAGNOSIS — M51.27 LUMBOSACRAL DISC HERNIATION: ICD-10-CM

## 2023-10-26 DIAGNOSIS — M54.32 LEFT SCIATIC NERVE PAIN: ICD-10-CM

## 2023-10-26 PROCEDURE — 99442 PR PHYS/QHP TELEPHONE EVALUATION 11-20 MIN: CPT | Performed by: NURSE PRACTITIONER

## 2023-10-26 RX ORDER — PREGABALIN 75 MG/1
75 CAPSULE ORAL NIGHTLY
Qty: 90 CAPSULE | Refills: 1 | Status: SHIPPED | OUTPATIENT
Start: 2023-10-26 | End: 2024-04-23

## 2023-10-26 RX ORDER — PREGABALIN 50 MG/1
50 CAPSULE ORAL EVERY MORNING
Qty: 90 CAPSULE | Refills: 1 | Status: SHIPPED | OUTPATIENT
Start: 2023-12-26 | End: 2024-06-23

## 2023-10-26 NOTE — PROGRESS NOTES
Josefina Robertson presents today for   Chief Complaint   Patient presents with    Lower Back Pain       Is someone accompanying this pt? no    Is the patient using any DME equipment during OV? no      Coordination of Care:  1. Have you been to the ER, urgent care clinic since your last visit? no  Hospitalized since your last visit? no    2. Have you seen or consulted any other health care providers outside of the [address] [place] [address] since your last visit? Yes, PCP Include any pap smears or colon screening.  no

## 2023-10-26 NOTE — PROGRESS NOTES
Malina Saavedra is a 76 y.o. female evaluated via telephone on 10/26/2023 for Lower Back Pain  . History of Present Illness: The patient is a 47-year-old female who has a lumbar disc herniation and spondylosis that was given her back pain and right-sided lower extremity pain down to her foot. Her radicular pain is controlled with Lyrica 50 mg in the morning and 75 mg at night. She was intolerant to higher doses. She denies any side effects from the medication. She denies fever bowel bladder dysfunction. Physical Exam: The patient is a 47-year-old female who is alert and oriented. She spoke with fluency. She did not appear to be in distress. Assessment/Plan: This is a patient with lumbar disc herniation and spondylosis his pain is well controlled with Lyrica 50 mg in the morning and 75 mg at night. I have sent in refills of her Lyrica for her. We will see her back in 6 months sooner if needed. Marilia Andersen was seen today for lower back pain. Diagnoses and all orders for this visit:    Lumbar spondylosis  -     pregabalin (LYRICA) 50 MG capsule; Take 1 capsule by mouth every morning for 180 days. Max Daily Amount: 50 mg  -     pregabalin (LYRICA) 75 MG capsule; Take 1 capsule by mouth at bedtime for 180 days. One po bid Max Daily Amount: 75 mg    Lumbosacral disc herniation  -     pregabalin (LYRICA) 50 MG capsule; Take 1 capsule by mouth every morning for 180 days. Max Daily Amount: 50 mg  -     pregabalin (LYRICA) 75 MG capsule; Take 1 capsule by mouth at bedtime for 180 days. One po bid Max Daily Amount: 75 mg    Left sciatic nerve pain  -     pregabalin (LYRICA) 50 MG capsule; Take 1 capsule by mouth every morning for 180 days. Max Daily Amount: 50 mg  -     pregabalin (LYRICA) 75 MG capsule; Take 1 capsule by mouth at bedtime for 180 days. One po bid Max Daily Amount: 75 mg          Documentation:  I communicated with the patient and/or health care decision maker about back and leg pain.

## 2023-10-30 ENCOUNTER — TELEPHONE (OUTPATIENT)
Age: 78
End: 2023-10-30

## 2023-10-30 NOTE — TELEPHONE ENCOUNTER
Patient called in stating that she needs a prior authrization on the prescription Lyrica      Please call and advise patient at  [phone]

## 2023-10-31 NOTE — TELEPHONE ENCOUNTER
Called pharmacy stated 2 direction on prescription need clarification. NP glory stated 1 at bedtime. Communicated to [facility] in pharmacy. Called patient received voicemail left message to call the office back. # provided.

## 2024-05-09 ENCOUNTER — OFFICE VISIT (OUTPATIENT)
Age: 79
End: 2024-05-09
Payer: MEDICARE

## 2024-05-09 VITALS
BODY MASS INDEX: 38.12 KG/M2 | HEART RATE: 81 BPM | HEIGHT: 64 IN | SYSTOLIC BLOOD PRESSURE: 105 MMHG | OXYGEN SATURATION: 93 % | TEMPERATURE: 97.6 F | WEIGHT: 223.3 LBS | DIASTOLIC BLOOD PRESSURE: 68 MMHG

## 2024-05-09 DIAGNOSIS — M47.816 LUMBAR SPONDYLOSIS: Primary | ICD-10-CM

## 2024-05-09 DIAGNOSIS — M51.27 LUMBOSACRAL DISC HERNIATION: ICD-10-CM

## 2024-05-09 DIAGNOSIS — M54.31 SCIATICA, RIGHT SIDE: ICD-10-CM

## 2024-05-09 DIAGNOSIS — M54.32 LEFT SCIATIC NERVE PAIN: ICD-10-CM

## 2024-05-09 PROCEDURE — G8417 CALC BMI ABV UP PARAM F/U: HCPCS | Performed by: NURSE PRACTITIONER

## 2024-05-09 PROCEDURE — 1123F ACP DISCUSS/DSCN MKR DOCD: CPT | Performed by: NURSE PRACTITIONER

## 2024-05-09 PROCEDURE — 1036F TOBACCO NON-USER: CPT | Performed by: NURSE PRACTITIONER

## 2024-05-09 PROCEDURE — G8400 PT W/DXA NO RESULTS DOC: HCPCS | Performed by: NURSE PRACTITIONER

## 2024-05-09 PROCEDURE — 1090F PRES/ABSN URINE INCON ASSESS: CPT | Performed by: NURSE PRACTITIONER

## 2024-05-09 PROCEDURE — 99214 OFFICE O/P EST MOD 30 MIN: CPT | Performed by: NURSE PRACTITIONER

## 2024-05-09 PROCEDURE — G8427 DOCREV CUR MEDS BY ELIG CLIN: HCPCS | Performed by: NURSE PRACTITIONER

## 2024-05-09 RX ORDER — PREGABALIN 50 MG/1
50 CAPSULE ORAL EVERY MORNING
Qty: 90 CAPSULE | Refills: 1 | Status: SHIPPED | OUTPATIENT
Start: 2024-06-22 | End: 2024-12-19

## 2024-05-09 RX ORDER — PREGABALIN 75 MG/1
75 CAPSULE ORAL NIGHTLY
Qty: 90 CAPSULE | Refills: 1 | Status: SHIPPED | OUTPATIENT
Start: 2024-05-09 | End: 2024-11-05

## 2024-05-09 NOTE — PROGRESS NOTES
Vivien Bashir presents today for   Chief Complaint   Patient presents with    Back Pain       Is someone accompanying this pt? no    Is the patient using any DME equipment during OV? no          Coordination of Care:  1. Have you been to the ER, urgent care clinic since your last visit? no  Hospitalized since your last visit? no    2. Have you seen or consulted any other health care providers outside of the [facility] since your last visit? no Include any pap smears or colon screening. no

## 2024-05-09 NOTE — PROGRESS NOTES
Chief complaint   Chief Complaint   Patient presents with    Back Pain       History of Present Illness:  Vivien Bashir is a  78 y.o.  female  who has a lumbar disc herniation and spondylosis that was given her back pain and right-sided lower extremity pain down to her foot.  Her radicular pain is controlled with Lyrica 50 mg in the morning and 75 mg at night.  She was intolerant to higher doses.  Today she states her right leg pain continues to be controlled but she has a lot of back pain.  She finds it more difficult to walk due to her back pain.  She has blood pressure and her PCP would not want her taking anti-inflammatories.  She is wanted to know what she can do for her back pain.  She denies fever bowel bladder dysfunction.     Physical Exam: The patient is a 78-year-old female well-developed well-nourished who is alert and oriented with a normal mood and affect.  She has a full weightbearing nonantalgic gait.  Did not use any assist device.  She has 4 out of 5 strength bilateral lower extremities.  Negative straight leg raise.  She has pain with hyperextension lumbar spine.          Assessment/Plan: This is a patient with lumbar disc herniation and spondylosis his pain is well controlled with Lyrica 50 mg in the morning and 75 mg at night.  I have sent in refills of her Lyrica for her.  I will put her in physical therapy to see if we can help with some of her back pain.  We will see her back in 2 months following her PT.      Vivien was seen today for back pain.    Diagnoses and all orders for this visit:    Lumbar spondylosis  -     [facility]  -     pregabalin (LYRICA) 50 MG capsule; Take 1 capsule by mouth every morning for 180 days. Max Daily Amount: 50 mg  -     pregabalin (LYRICA) 75 MG capsule; Take 1 capsule by mouth at bedtime for 180 days. One po bid Max Daily Amount: 75 mg    Lumbosacral disc herniation  -     [facility] -

## 2024-06-03 ENCOUNTER — HOSPITAL ENCOUNTER (OUTPATIENT)
Facility: HOSPITAL | Age: 79
Setting detail: RECURRING SERIES
Discharge: HOME | End: 2024-06-06
Payer: MEDICARE

## 2024-06-03 PROCEDURE — 97161 PT EVAL LOW COMPLEX 20 MIN: CPT

## 2024-06-03 PROCEDURE — 97110 THERAPEUTIC EXERCISES: CPT

## 2024-06-03 NOTE — THERAPY EVALUATION
[facility]  [address] [address] Ph: [phone] Fx: [phone]  Plan of Care / Statement of Necessity for Physical Therapy Services     Patient Name: Vivien Bashir : 1945   Medical   Diagnosis: Other low back pain [M54.59]  Treatment Diagnosis:   M54.59  OTHER LOWER BACK PAIN    Onset Date:      Referral Source: Kiara Yi APRN - * Start of Care (SOC): 6/3/2024   Prior Hospitalization: See medical history Provider #: [id number]   Prior Level of Function: Previous no pain/diff with amb > 10 min in community   Comorbidities: Arthritis, high chol, HTN     Assessment / key information:  Vivien Bashir is a 78 y.o.  yo female with Dx: LBP.  Reports symptoms began insidiously in .  Pt rates pain as 8/10 max, 5/10 today, located at across lower back and increases with any prolonged amb or standing.  Objective: Oswestry Disability Index (LUCIO) for Low Back Pain = 32 showing MODERATE disability.  Palpation: tender left > right LS paraspinals and central  Strength: reduced hip flex bilat, painful supine bridge, unable to fire TA without cough/physical cueing ROM: reduced LS rotation 50% with discomfort;  Special Tests: + SLR Left, BRANDEN + bilat, noted stertch with SKC;  Other: reduced pain with seated LS flex.  Pt instructed in HEP and will f/u in clinic for PT.     Not post operative    Evaluation Complexity:  History:  MEDIUM  Complexity : 1-2 comorbidities / personal factors will impact the outcome/ POC ; Examination:  HIGH Complexity : 4+ Standardized tests and measures addressing body structure, function, activity limitation and / or participation in recreation  ;Presentation:  LOW Complexity : Stable, uncomplicated  ;Clinical Decision Making: Oswestry Disability Index (LUCIO) for Low Back Pain = 32 % ; (21% - 40% Moderate Disability) = MODERATE Complexity  Overall Complexity Rating: LOW   Problem List: pain affecting function, decrease

## 2024-06-03 NOTE — PROGRESS NOTES
PHYSICAL / OCCUPATIONAL THERAPY - DAILY TREATMENT NOTE (updated )  For Eval visit    Patient Name: Vivien Bashir    Date: 6/3/2024    : 1945  Insurance: Payor: MEDICARE / Plan: MEDICARE PART A AND B / Product Type: *No Product type* /      Patient  verified yes     Visit #   Current / Total 1 16   Time   In / Out 10:24 10:59   Pain   In / Out 5/10 5/10   Subjective Functional Status/Changes: See POC     TREATMENT AREA =  see POC    OBJECTIVE         15 min   Eval - untimed                      Therapeutic Procedures:    Tx Min Billable or 1:1 Min (if diff from Tx Min) Procedure, Rationale, Specifics   10  08194 Therapeutic Exercise (timed):  increase ROM, strength, coordination, balance, and proprioception to improve patient's ability to progress to PLOF and address remaining functional goals. (see flow sheet as applicable)     Details if applicable:              Details if applicable:            Details if applicable:            Details if applicable:       [facility] Totals Reminder: bill using total billable min of TIMED therapeutic procedures (example: do not include dry needle or estim unattended, both untimed codes, in totals to left)  8-22 min = 1 unit; 23-37 min = 2 units; 38-52 min = 3 units; 53-67 min = 4 units; 68-82 min = 5 units   Total Total     [x]  Patient Education billed concurrently with other procedures   [x] Review HEP    [] Progressed/Changed HEP, detail:    [] Other detail:       Objective Information/Functional Measures/Assessment    See POC    Patient will continue to benefit from skilled PT / OT services to modify and progress therapeutic interventions, analyze and address functional mobility deficits, analyze and address ROM deficits, analyze and address strength deficits, and analyze and address soft tissue restrictions to address functional deficits and attain remaining goals.    Progress toward goals / Updated goals:  [x]  See POC    Short Term Goals: To be accomplished in

## 2024-06-04 ENCOUNTER — HOSPITAL ENCOUNTER (OUTPATIENT)
Facility: HOSPITAL | Age: 79
Setting detail: RECURRING SERIES
Discharge: HOME | End: 2024-06-07
Payer: MEDICARE

## 2024-06-04 PROCEDURE — 97112 NEUROMUSCULAR REEDUCATION: CPT

## 2024-06-04 PROCEDURE — 97530 THERAPEUTIC ACTIVITIES: CPT

## 2024-06-04 PROCEDURE — 97110 THERAPEUTIC EXERCISES: CPT

## 2024-06-04 NOTE — PROGRESS NOTES
PHYSICAL / OCCUPATIONAL THERAPY - DAILY TREATMENT NOTE    Patient Name: Vivien Bashir    Date: 2024    : 1945  Insurance: Payor: MEDICARE / Plan: MEDICARE PART A AND B / Product Type: *No Product type* /      Patient  verified Yes     Visit #   Current / Total 2 16   Time   In / Out 942 1030   Pain   In / Out 0 0   Subjective Functional Status/Changes: Patient states that she has most increase pain with standing in one spot.      TREATMENT AREA =  Other low back pain [M54.59]     OBJECTIVE    Heat (UNBILLED):  location/position: sitting; back  .    Min Rationale   102 decrease pain to improve patient's ability to progress to PLOF and address remaining functional goals.     Skin assessment post-treatment:   Intact     Therapeutic Procedures:    81336 Therapeutic Exercise (timed):  increase ROM, strength, coordination, balance, and proprioception to improve patient's ability to progress to PLOF and address remaining functional goals.   Tx Min Billable or 1:1 Min   (if diff from Tx Min) Details:   20  See flow sheet as applicable     49751 Neuromuscular Re-Education (timed):  improve balance, coordination, kinesthetic sense, posture, core stability and proprioception to improve patient's ability to develop conscious control of individual muscles and awareness of position of extremities in order to progress to PLOF and address remaining functional goals.   Tx Min Billable or 1:1 Min   (if diff from Tx Min) Details:   10  See flow sheet as applicable     78951 Therapeutic Activity (timed):  use of dynamic activities replicating functional movements to increase ROM, strength, coordination, balance, and proprioception in order to improve patient's ability to progress to PLOF and address remaining functional goals.   Tx Min Billable or 1:1 Min   (if diff from Tx Min) Details:   8  See flow sheet as applicable       38  [facility] Totals Reminder: bill using total billable min of TIMED therapeutic procedures

## 2024-06-10 ENCOUNTER — TELEPHONE (OUTPATIENT)
Facility: HOSPITAL | Age: 79
End: 2024-06-10

## 2024-06-10 ENCOUNTER — APPOINTMENT (OUTPATIENT)
Facility: HOSPITAL | Age: 79
End: 2024-06-10
Payer: MEDICARE

## 2024-06-11 ENCOUNTER — HOSPITAL ENCOUNTER (OUTPATIENT)
Facility: HOSPITAL | Age: 79
Setting detail: RECURRING SERIES
Discharge: HOME | End: 2024-06-14
Payer: MEDICARE

## 2024-06-11 PROCEDURE — 97110 THERAPEUTIC EXERCISES: CPT

## 2024-06-11 PROCEDURE — 97112 NEUROMUSCULAR REEDUCATION: CPT

## 2024-06-11 PROCEDURE — 97530 THERAPEUTIC ACTIVITIES: CPT

## 2024-06-11 NOTE — PROGRESS NOTES
PHYSICAL / OCCUPATIONAL THERAPY - DAILY TREATMENT NOTE    Patient Name: Vivien Bashir    Date: 2024    : 1945  Insurance: Payor: MEDICARE / Plan: MEDICARE PART A AND B / Product Type: *No Product type* /      Patient  verified Yes     Visit #   Current / Total 3 16   Time   In / Out 1019 1100   Pain   In / Out 2 0   Subjective Functional Status/Changes: Patient states she was sad over the weekend because she told her daughter she couldn't go to a festival over the weekend because she could not walk far enough for it.      TREATMENT AREA =  Other low back pain [M54.59]     OBJECTIVE    Therapeutic Procedures:    71727 Therapeutic Exercise (timed):  increase ROM, strength, coordination, balance, and proprioception to improve patient's ability to progress to PLOF and address remaining functional goals.   Tx Min Billable or 1:1 Min   (if diff from Tx Min) Details:   21   See flow sheet as applicable      51851 Neuromuscular Re-Education (timed):  improve balance, coordination, kinesthetic sense, posture, core stability and proprioception to improve patient's ability to develop conscious control of individual muscles and awareness of position of extremities in order to progress to PLOF and address remaining functional goals.   Tx Min Billable or 1:1 Min   (if diff from Tx Min) Details:   10   See flow sheet as applicable      94854 Therapeutic Activity (timed):  use of dynamic activities replicating functional movements to increase ROM, strength, coordination, balance, and proprioception in order to improve patient's ability to progress to PLOF and address remaining functional goals.   Tx Min Billable or 1:1 Min   (if diff from Tx Min) Details:   10   See flow sheet as applicable         41   [facility] Totals Reminder: bill using total billable min of TIMED therapeutic procedures (example: do not include dry needle or estim unattended, both untimed codes, in totals to left)  8-22 min = 1 unit; 23-37 min = 2

## 2024-06-13 ENCOUNTER — HOSPITAL ENCOUNTER (OUTPATIENT)
Facility: HOSPITAL | Age: 79
Setting detail: RECURRING SERIES
Discharge: HOME | End: 2024-06-16
Payer: MEDICARE

## 2024-06-13 PROCEDURE — 97112 NEUROMUSCULAR REEDUCATION: CPT | Performed by: PHYSICAL THERAPIST

## 2024-06-13 PROCEDURE — 97110 THERAPEUTIC EXERCISES: CPT | Performed by: PHYSICAL THERAPIST

## 2024-06-13 PROCEDURE — 97530 THERAPEUTIC ACTIVITIES: CPT | Performed by: PHYSICAL THERAPIST

## 2024-06-13 NOTE — PROGRESS NOTES
PHYSICAL / OCCUPATIONAL THERAPY - DAILY TREATMENT NOTE    Patient Name: Vivien Bashir    Date: 2024    : 1945  Insurance: Payor: MEDICARE / Plan: MEDICARE PART A AND B / Product Type: *No Product type* /      Patient  verified Yes     Visit #   Current / Total 4 16   Time   In / Out 10:58 11:49   Pain   In / Out 2-3 0   Subjective Functional Status/Changes: Patient reports her max pain is 3/10 unless she is walking in the store, then needs to lean on a cart and pain up to 8/10 still.     TREATMENT AREA =  Other low back pain [M54.59]     OBJECTIVE    Heat (UNBILLED):  location/position: lower back/sitting .    Min Rationale   10 decrease pain to improve patient's ability to progress to PLOF and address remaining functional goals.     Skin assessment post-treatment:   Intact     Therapeutic Procedures:    87638 Therapeutic Exercise (timed):  increase ROM, strength, coordination, balance, and proprioception to improve patient's ability to progress to PLOF and address remaining functional goals.   Tx Min Billable or 1:1 Min   (if diff from Tx Min) Details:   21  See flow sheet as applicable     90910 Neuromuscular Re-Education (timed):  improve balance, coordination, kinesthetic sense, posture, core stability and proprioception to improve patient's ability to develop conscious control of individual muscles and awareness of position of extremities in order to progress to PLOF and address remaining functional goals.   Tx Min Billable or 1:1 Min   (if diff from Tx Min) Details:   10  See flow sheet as applicable     38747 Therapeutic Activity (timed):  use of dynamic activities replicating functional movements to increase ROM, strength, coordination, balance, and proprioception in order to improve patient's ability to progress to PLOF and address remaining functional goals.   Tx Min Billable or 1:1 Min   (if diff from Tx Min) Details:   10  See flow sheet as applicable       41  [facility] Totals Reminder:

## 2024-06-17 ENCOUNTER — HOSPITAL ENCOUNTER (OUTPATIENT)
Facility: HOSPITAL | Age: 79
Setting detail: RECURRING SERIES
Discharge: HOME | End: 2024-06-20
Payer: MEDICARE

## 2024-06-17 PROCEDURE — 97530 THERAPEUTIC ACTIVITIES: CPT

## 2024-06-17 PROCEDURE — 97112 NEUROMUSCULAR REEDUCATION: CPT

## 2024-06-17 PROCEDURE — 97110 THERAPEUTIC EXERCISES: CPT

## 2024-06-17 NOTE — PROGRESS NOTES
PHYSICAL / OCCUPATIONAL THERAPY - DAILY TREATMENT NOTE    Patient Name: Vivien Bashir    Date: 2024    : 1945  Insurance: Payor: MEDICARE / Plan: MEDICARE PART A AND B / Product Type: *No Product type* /      Patient  verified Yes     Visit #   Current / Total 5 16   Time   In / Out 945 1037   Pain   In / Out 3 0   Subjective Functional Status/Changes: Patient states she feels she is walking more upright.      TREATMENT AREA =  Other low back pain [M54.59]     OBJECTIVE    Heat (UNBILLED):  location/position: lower back/sitting .    Min Rationale   10 decrease pain to improve patient's ability to progress to PLOF and address remaining functional goals.     Skin assessment post-treatment:   Intact      Therapeutic Procedures:     69968 Therapeutic Exercise (timed):  increase ROM, strength, coordination, balance, and proprioception to improve patient's ability to progress to PLOF and address remaining functional goals.   Tx Min Billable or 1:1 Min   (if diff from Tx Min) Details:   22   See flow sheet as applicable      07921 Neuromuscular Re-Education (timed):  improve balance, coordination, kinesthetic sense, posture, core stability and proprioception to improve patient's ability to develop conscious control of individual muscles and awareness of position of extremities in order to progress to PLOF and address remaining functional goals.   Tx Min Billable or 1:1 Min   (if diff from Tx Min) Details:   10   See flow sheet as applicable      42649 Therapeutic Activity (timed):  use of dynamic activities replicating functional movements to increase ROM, strength, coordination, balance, and proprioception in order to improve patient's ability to progress to PLOF and address remaining functional goals.   Tx Min Billable or 1:1 Min   (if diff from Tx Min) Details:   10   See flow sheet as applicable         42   [facility] Totals Reminder: bill using total billable min of TIMED therapeutic procedures (example:

## 2024-06-20 ENCOUNTER — HOSPITAL ENCOUNTER (OUTPATIENT)
Facility: HOSPITAL | Age: 79
Setting detail: RECURRING SERIES
Discharge: HOME | End: 2024-06-23
Payer: MEDICARE

## 2024-06-20 PROCEDURE — 97110 THERAPEUTIC EXERCISES: CPT

## 2024-06-20 PROCEDURE — 97112 NEUROMUSCULAR REEDUCATION: CPT

## 2024-06-20 PROCEDURE — 97530 THERAPEUTIC ACTIVITIES: CPT

## 2024-06-20 NOTE — PROGRESS NOTES
units; 68-82 min = 5 units   Total Total             [x]  Patient Education billed concurrently with other procedures   [x] Review HEP    [] Progressed/Changed HEP, detail:    [] Other detail:       Objective Information/Functional Measures/Assessment    Initiated resistance with supine knee fall out to improve hip strength. Patient noted soreness after performing seated press out and rotation last visit.     Patient will continue to benefit from skilled PT / OT services to modify and progress therapeutic interventions, analyze and address functional mobility deficits, analyze and address ROM deficits, analyze and address strength deficits, analyze and address soft tissue restrictions, analyze and cue for proper movement patterns, analyze and modify for postural abnormalities, analyze and address imbalance/dizziness, and instruct in home and community integration to address functional deficits and attain remaining goals.    Progress toward goals / Updated goals:  []  See Progress Note/Recertification      Progress toward goals / Updated goals:  []  See Progress Note/Recertification  Short Term Goals: To be accomplished in  3-4  weeks:  1. Independent with HEP.  EVAL: NA  Current: MET: pt reports performing HEP. 6/17/24  2. Decrease max pain 25-50% to assist with amb/stand > 10 min.  EVAL: pain 8/10 max  Current:  Pain at most this past week 3/10.  6/13/2024.  Goal Met.  3. Will be able to perform supine bridge, 5 second hold without increase pain to show improved core strength and bed mobility.  EVAL: painful supine bridge  Current: progressing; minimal pain with bed mobility. 6/11/24     Long Term Goals: To be accomplished in  6-8  weeks:  1.  Decrease max pain 50-75% to assist with amb/stand > 20 min and amb in store without leaning on cart.  EVAL: pain 8/10 max, using cart to lean on at store  Current:  Walking across [place] with cart, pain 8/10.  6/13/2024.  No change.  2.  Improve Oswestry Disability Index (LUCIO)

## 2024-06-24 ENCOUNTER — HOSPITAL ENCOUNTER (OUTPATIENT)
Facility: HOSPITAL | Age: 79
Setting detail: RECURRING SERIES
Discharge: HOME | End: 2024-06-27
Payer: MEDICARE

## 2024-06-24 PROCEDURE — 97110 THERAPEUTIC EXERCISES: CPT

## 2024-06-24 PROCEDURE — 97112 NEUROMUSCULAR REEDUCATION: CPT

## 2024-06-24 PROCEDURE — 97530 THERAPEUTIC ACTIVITIES: CPT

## 2024-06-24 NOTE — PROGRESS NOTES
amb/stand > 20 min and amb in store without leaning on cart.  EVAL: pain 8/10 max, using cart to lean on at store  Current:  with walking, pain 4/10.  6/24/2024.  progressing.  2.  Improve Oswestry Disability Index (LUCIO) for Low Back Pain by 13% points in order to show significant functional improvement.  EVAL: OSW = 32%  3.  Will be indep with HEP and prepared to DC to HEP for self management.  EVAL:  NA    Next PN/ RC due 7/2/24, 8/1/24  Auth due NA    PLAN  - Continue Plan of Care    MARY DOMINGUEZ PT    6/24/2024    9:44 AM    Future Appointments   Date Time Provider Department Center   7/1/2024 10:20 AM Edwardo De La Torre PTA [facility]   7/8/2024  9:40 AM Edwardo De La Torre PTA [facility]   7/11/2024  9:40 AM Mary Dominguez, GILBERT [facility]

## 2024-07-01 ENCOUNTER — HOSPITAL ENCOUNTER (OUTPATIENT)
Facility: HOSPITAL | Age: 79
Setting detail: RECURRING SERIES
Discharge: HOME | End: 2024-07-04
Payer: MEDICARE

## 2024-07-01 PROCEDURE — 97112 NEUROMUSCULAR REEDUCATION: CPT

## 2024-07-01 PROCEDURE — 97530 THERAPEUTIC ACTIVITIES: CPT

## 2024-07-01 PROCEDURE — 97110 THERAPEUTIC EXERCISES: CPT

## 2024-07-01 NOTE — PROGRESS NOTES
PHYSICAL / OCCUPATIONAL THERAPY - DAILY TREATMENT NOTE    Patient Name: Vivien Bashir    Date: 2024    : 1945  Insurance: Payor: MEDICARE / Plan: MEDICARE PART A AND B / Product Type: *No Product type* /      Patient  verified Yes     Visit #   Current / Total 8 16   Time   In / Out 1015 1056   Pain   In / Out 5 0   Subjective Functional Status/Changes: Patient states she has been helping her daughter post surgery over the weekend and the bed she is sleeping in and not being able to perform her HEP fully has aggravated her back pain.      TREATMENT AREA =  Other low back pain [M54.59]     OBJECTIVE      Therapeutic Procedures:    75269 Therapeutic Exercise (timed):  increase ROM, strength, coordination, balance, and proprioception to improve patient's ability to progress to PLOF and address remaining functional goals.   Tx Min Billable or 1:1 Min   (if diff from Tx Min) Details:   20   See flow sheet as applicable      53269 Neuromuscular Re-Education (timed):  improve balance, coordination, kinesthetic sense, posture, core stability and proprioception to improve patient's ability to develop conscious control of individual muscles and awareness of position of extremities in order to progress to PLOF and address remaining functional goals.   Tx Min Billable or 1:1 Min   (if diff from Tx Min) Details:   11   See flow sheet as applicable      83240 Therapeutic Activity (timed):  use of dynamic activities replicating functional movements to increase ROM, strength, coordination, balance, and proprioception in order to improve patient's ability to progress to PLOF and address remaining functional goals.   Tx Min Billable or 1:1 Min   (if diff from Tx Min) Details:   10   See flow sheet as applicable         41   [facility] Totals Reminder: bill using total billable min of TIMED therapeutic procedures (example: do not include dry needle or estim unattended, both untimed codes, in totals to left)  8-22 min = 1

## 2024-07-01 NOTE — THERAPY RECERTIFICATION
[facility]  [address] [address] Ph: [phone] Fx: [phone]  PHYSICAL THERAPY PROGRESS NOTE  Patient Name: Vivien Bashir : 1945   Treatment/Medical Diagnosis: Other low back pain [M54.59]   Referral Source: Kiara Yi APRN - *     Date of Initial Visit: 6/3/2024 Attended Visits: 8 Missed Visits: 1     SUMMARY OF TREATMENT  Patient reports 45% improvement with overall symptoms. Patient continues to have limited static standing tolerance, but has improved with sitting and ambulation tolerance.     CURRENT STATUS    Short Term Goals: To be accomplished in  3-4  weeks:  1. Independent with HEP.  EVAL: NA  Current: MET: pt reports performing HEP. 24  2. Decrease max pain 25-50% to assist with amb/stand > 10 min.  EVAL: pain 8/10 max  Current:  Pain at most this past week 3/10.  2024.  Goal Met.  3. Will be able to perform supine bridge, 5 second hold without increase pain to show improved core strength and bed mobility.  EVAL: painful supine bridge  Current: progressing; minimal pain with bridges with 5 second hold 24     Long Term Goals: To be accomplished in  6-8  weeks:  1.  Decrease max pain 50-75% to assist with amb/stand > 20 min and amb in store without leaning on cart.  EVAL: pain 8/10 max, using cart to lean on at store  Current:  progressing. with walking, pain 10.  2024.    2.  Improve Oswestry Disability Index (LUCIO) for Low Back Pain by 13% points in order to show significant functional improvement.  EVAL: OSW = 32%  Current: regressed: OSQ = 42%. 24  3.  Will be indep with HEP and prepared to DC to HEP for self management.  EVAL:  NA  Current; progressing: patient is not fully compliant with HEP.       Payor: MEDICARE / Plan: MEDICARE PART A AND B / Product Type: *No Product type* /     Medicare, cannot change goals, cannot adjust frequency/duration, no signature required   Reporting Period: (date

## 2024-07-08 ENCOUNTER — HOSPITAL ENCOUNTER (OUTPATIENT)
Facility: HOSPITAL | Age: 79
Setting detail: RECURRING SERIES
Discharge: HOME | End: 2024-07-11
Payer: MEDICARE

## 2024-07-08 PROCEDURE — 97110 THERAPEUTIC EXERCISES: CPT

## 2024-07-08 PROCEDURE — 97530 THERAPEUTIC ACTIVITIES: CPT

## 2024-07-08 PROCEDURE — 97112 NEUROMUSCULAR REEDUCATION: CPT

## 2024-07-08 NOTE — PROGRESS NOTES
PHYSICAL / OCCUPATIONAL THERAPY - DAILY TREATMENT NOTE    Patient Name: Vivien Bashir    Date: 2024    : 1945  Insurance: Payor: MEDICARE / Plan: MEDICARE PART A AND B / Product Type: *No Product type* /      Patient  verified Yes     Visit #   Current / Total 9 16   Time   In / Out 938 1030   Pain   In / Out 2 0   Subjective Functional Status/Changes: Patient noticed increased ease with going up and down stairs while staying at her [facility].      TREATMENT AREA =  Other low back pain [M54.59]     OBJECTIVE        Therapeutic Procedures:      94709 Therapeutic Exercise (timed):  increase ROM, strength, coordination, balance, and proprioception to improve patient's ability to progress to PLOF and address remaining functional goals.   Tx Min Billable or 1:1 Min   (if diff from Tx Min) Details:   20   See flow sheet as applicable      19854 Neuromuscular Re-Education (timed):  improve balance, coordination, kinesthetic sense, posture, core stability and proprioception to improve patient's ability to develop conscious control of individual muscles and awareness of position of extremities in order to progress to PLOF and address remaining functional goals.   Tx Min Billable or 1:1 Min   (if diff from Tx Min) Details:   11   See flow sheet as applicable      64193 Therapeutic Activity (timed):  use of dynamic activities replicating functional movements to increase ROM, strength, coordination, balance, and proprioception in order to improve patient's ability to progress to PLOF and address remaining functional goals.   Tx Min Billable or 1:1 Min   (if diff from Tx Min) Details:   11   See flow sheet as applicable         42   [facility] Totals Reminder: bill using total billable min of TIMED therapeutic procedures (example: do not include dry needle or estim unattended, both untimed codes, in totals to left)  8-22 min = 1 unit; 23-37 min = 2 units; 38-52 min = 3 units; 53-67 min = 4 units; 68-82 min = 5

## 2024-07-11 ENCOUNTER — HOSPITAL ENCOUNTER (OUTPATIENT)
Facility: HOSPITAL | Age: 79
Setting detail: RECURRING SERIES
Discharge: HOME | End: 2024-07-14
Payer: MEDICARE

## 2024-07-11 PROCEDURE — 97112 NEUROMUSCULAR REEDUCATION: CPT

## 2024-07-11 PROCEDURE — 97110 THERAPEUTIC EXERCISES: CPT

## 2024-07-11 PROCEDURE — 97530 THERAPEUTIC ACTIVITIES: CPT

## 2024-07-11 NOTE — PROGRESS NOTES
Back Pain by 13% points in order to show significant functional improvement.  EVAL: OSW = 32%  Current: regressed: OSQ = 42%. 7/1/24  3.  Will be indep with HEP and prepared to DC to HEP for self management.  EVAL:  NA  Current; progressing: patient is not fully compliant with HEP.      Next PN/ RC due 8/1/24 RC  Auth due NA    PLAN  - Continue Plan of Care    EDWARDO AMES PTA    7/11/2024    12:37 PM    Future Appointments   Date Time Provider Department Center   7/15/2024 10:20 AM Edwardo Ames PTA [facility]   7/17/2024 11:00 AM Edwardo Ames PTA [facility]   7/22/2024 11:00 AM Edwardo Ames PTA [facility]   7/25/2024 11:40 AM Edwardo Ames PTA [facility]   7/29/2024 11:00 AM Edwardo Ames PTA [facility]   7/31/2024 11:00 AM Edwardo Ames PTA [facility]

## 2024-07-15 ENCOUNTER — HOSPITAL ENCOUNTER (OUTPATIENT)
Facility: HOSPITAL | Age: 79
Setting detail: RECURRING SERIES
Discharge: HOME | End: 2024-07-18
Payer: MEDICARE

## 2024-07-15 PROCEDURE — 97110 THERAPEUTIC EXERCISES: CPT

## 2024-07-15 PROCEDURE — 97112 NEUROMUSCULAR REEDUCATION: CPT

## 2024-07-15 PROCEDURE — 97530 THERAPEUTIC ACTIVITIES: CPT

## 2024-07-15 NOTE — PROGRESS NOTES
PHYSICAL / OCCUPATIONAL THERAPY - DAILY TREATMENT NOTE    Patient Name: Vivien Bashir    Date: 7/15/2024    : 1945  Insurance: Payor: MEDICARE / Plan: MEDICARE PART A AND B / Product Type: *No Product type* /      Patient  verified Yes     Visit #   Current / Total 11 16   Time   In / Out 1020 1110   Pain   In / Out 2 0   Subjective Functional Status/Changes: Patient states she has noticed increased ease with getting up from sitting down.      TREATMENT AREA =  Other low back pain [M54.59]     OBJECTIVE    Heat (UNBILLED):  location/position: sitting; back  .    Min Rationale   10 decrease pain to improve patient's ability to progress to PLOF and address remaining functional goals.     Skin assessment post-treatment:   Intact      Therapeutic Procedures:       02289 Therapeutic Exercise (timed):  increase ROM, strength, coordination, balance, and proprioception to improve patient's ability to progress to PLOF and address remaining functional goals.   Tx Min Billable or 1:1 Min   (if diff from Tx Min) Details:   20   See flow sheet as applicable      37849 Neuromuscular Re-Education (timed):  improve balance, coordination, kinesthetic sense, posture, core stability and proprioception to improve patient's ability to develop conscious control of individual muscles and awareness of position of extremities in order to progress to PLOF and address remaining functional goals.   Tx Min Billable or 1:1 Min   (if diff from Tx Min) Details:   11   See flow sheet as applicable      64794 Therapeutic Activity (timed):  use of dynamic activities replicating functional movements to increase ROM, strength, coordination, balance, and proprioception in order to improve patient's ability to progress to PLOF and address remaining functional goals.   Tx Min Billable or 1:1 Min   (if diff from Tx Min) Details:   9   See flow sheet as applicable         40   [facility] Totals Reminder: bill using total billable min of TIMED

## 2024-07-17 ENCOUNTER — HOSPITAL ENCOUNTER (OUTPATIENT)
Facility: HOSPITAL | Age: 79
Setting detail: RECURRING SERIES
Discharge: HOME | End: 2024-07-20
Payer: MEDICARE

## 2024-07-17 PROCEDURE — 97530 THERAPEUTIC ACTIVITIES: CPT

## 2024-07-17 PROCEDURE — 97112 NEUROMUSCULAR REEDUCATION: CPT

## 2024-07-17 PROCEDURE — 97110 THERAPEUTIC EXERCISES: CPT

## 2024-07-17 NOTE — PROGRESS NOTES
PHYSICAL / OCCUPATIONAL THERAPY - DAILY TREATMENT NOTE    Patient Name: Vivien Bashir    Date: 2024    : 1945  Insurance: Payor: MEDICARE / Plan: MEDICARE PART A AND B / Product Type: *No Product type* /      Patient  verified Yes     Visit #   Current / Total 12 16   Time   In / Out 1105 143   Pain   In / Out 2 0   Subjective Functional Status/Changes: Patient states that she has an ambulation tolerance of 2 mins without holding on to a grocery basket or anything due to increase in pain in her back.      TREATMENT AREA =  Other low back pain [M54.59]     OBJECTIVE      Therapeutic Procedures:       21216 Therapeutic Exercise (timed):  increase ROM, strength, coordination, balance, and proprioception to improve patient's ability to progress to PLOF and address remaining functional goals.   Tx Min Billable or 1:1 Min   (if diff from Tx Min) Details:   20   See flow sheet as applicable      41326 Neuromuscular Re-Education (timed):  improve balance, coordination, kinesthetic sense, posture, core stability and proprioception to improve patient's ability to develop conscious control of individual muscles and awareness of position of extremities in order to progress to PLOF and address remaining functional goals.   Tx Min Billable or 1:1 Min   (if diff from Tx Min) Details:   10   See flow sheet as applicable      19142 Therapeutic Activity (timed):  use of dynamic activities replicating functional movements to increase ROM, strength, coordination, balance, and proprioception in order to improve patient's ability to progress to PLOF and address remaining functional goals.   Tx Min Billable or 1:1 Min   (if diff from Tx Min) Details:   8   See flow sheet as applicable         38  [facility] Totals Reminder: bill using total billable min of TIMED therapeutic procedures (example: do not include dry needle or estim unattended, both untimed codes, in totals to left)  8-22 min = 1 unit; 23-37 min = 2 units; 38-52

## 2024-07-22 ENCOUNTER — HOSPITAL ENCOUNTER (OUTPATIENT)
Facility: HOSPITAL | Age: 79
Setting detail: RECURRING SERIES
Discharge: HOME | End: 2024-07-25
Payer: MEDICARE

## 2024-07-22 PROCEDURE — 97530 THERAPEUTIC ACTIVITIES: CPT

## 2024-07-22 PROCEDURE — 97112 NEUROMUSCULAR REEDUCATION: CPT

## 2024-07-22 PROCEDURE — 97110 THERAPEUTIC EXERCISES: CPT

## 2024-07-22 NOTE — PROGRESS NOTES
PHYSICAL / OCCUPATIONAL THERAPY - DAILY TREATMENT NOTE    Patient Name: Vivien Bashir    Date: 2024    : 1945  Insurance: Payor: MEDICARE / Plan: MEDICARE PART A AND B / Product Type: *No Product type* /      Patient  verified Yes     Visit #   Current / Total 13 16   Time   In / Out 1101 1157   Pain   In / Out 2 0   Subjective Functional Status/Changes: Patient states she timed herself and she was able to stand/walk for 2 mins without holding on to anything without having increase in pain. Patient states that mostly bending/squatting activities aggravate her back.      TREATMENT AREA =  Other low back pain [M54.59]     OBJECTIVE    Heat (UNBILLED):  location/position: sitting; back .    Min Rationale   10 decrease pain to improve patient's ability to progress to PLOF and address remaining functional goals.     Skin assessment post-treatment:   Intact     Therapeutic Procedures:      87392 Therapeutic Exercise (timed):  increase ROM, strength, coordination, balance, and proprioception to improve patient's ability to progress to PLOF and address remaining functional goals.   Tx Min Billable or 1:1 Min   (if diff from Tx Min) Details:   26 20  See flow sheet as applicable      95705 Neuromuscular Re-Education (timed):  improve balance, coordination, kinesthetic sense, posture, core stability and proprioception to improve patient's ability to develop conscious control of individual muscles and awareness of position of extremities in order to progress to PLOF and address remaining functional goals.   Tx Min Billable or 1:1 Min   (if diff from Tx Min) Details:   10 10  See flow sheet as applicable      16964 Therapeutic Activity (timed):  use of dynamic activities replicating functional movements to increase ROM, strength, coordination, balance, and proprioception in order to improve patient's ability to progress to PLOF and address remaining functional goals.   Tx Min Billable or 1:1 Min   (if diff

## 2024-07-24 ENCOUNTER — APPOINTMENT (OUTPATIENT)
Facility: HOSPITAL | Age: 79
End: 2024-07-24
Payer: MEDICARE

## 2024-07-25 ENCOUNTER — HOSPITAL ENCOUNTER (OUTPATIENT)
Facility: HOSPITAL | Age: 79
Setting detail: RECURRING SERIES
Discharge: HOME | End: 2024-07-28
Payer: MEDICARE

## 2024-07-25 PROCEDURE — 97112 NEUROMUSCULAR REEDUCATION: CPT

## 2024-07-25 PROCEDURE — 97530 THERAPEUTIC ACTIVITIES: CPT

## 2024-07-25 PROCEDURE — 97110 THERAPEUTIC EXERCISES: CPT

## 2024-07-25 NOTE — PROGRESS NOTES
PHYSICAL / OCCUPATIONAL THERAPY - DAILY TREATMENT NOTE    Patient Name: Vivien Bashir    Date: 2024    : 1945  Insurance: Payor: MEDICARE / Plan: MEDICARE PART A AND B / Product Type: *No Product type* /      Patient  verified Yes     Visit #   Current / Total 14 16   Time   In / Out 1015 1100   Pain   In / Out 0 0   Subjective Functional Status/Changes: Patient states she feels she is walking better overall.      TREATMENT AREA =  Other low back pain [M54.59]     OBJECTIVE        Therapeutic Procedures:      56365 Therapeutic Exercise (timed):  increase ROM, strength, coordination, balance, and proprioception to improve patient's ability to progress to PLOF and address remaining functional goals.   Tx Min Billable or 1:1 Min   (if diff from Tx Min) Details:   20   See flow sheet as applicable      06710 Neuromuscular Re-Education (timed):  improve balance, coordination, kinesthetic sense, posture, core stability and proprioception to improve patient's ability to develop conscious control of individual muscles and awareness of position of extremities in order to progress to PLOF and address remaining functional goals.   Tx Min Billable or 1:1 Min   (if diff from Tx Min) Details:   16   See flow sheet as applicable      83285 Therapeutic Activity (timed):  use of dynamic activities replicating functional movements to increase ROM, strength, coordination, balance, and proprioception in order to improve patient's ability to progress to PLOF and address remaining functional goals.   Tx Min Billable or 1:1 Min   (if diff from Tx Min) Details:   9   See flow sheet as applicable         45   [facility] Totals Reminder: bill using total billable min of TIMED therapeutic procedures (example: do not include dry needle or estim unattended, both untimed codes, in totals to left)  8-22 min = 1 unit; 23-37 min = 2 units; 38-52 min = 3 units; 53-67 min = 4 units; 68-82 min = 5 units   Total Total             [x]

## 2024-07-29 ENCOUNTER — HOSPITAL ENCOUNTER (OUTPATIENT)
Facility: HOSPITAL | Age: 79
Setting detail: RECURRING SERIES
Discharge: HOME | End: 2024-08-01
Payer: MEDICARE

## 2024-07-29 PROCEDURE — 97110 THERAPEUTIC EXERCISES: CPT

## 2024-07-29 PROCEDURE — 97112 NEUROMUSCULAR REEDUCATION: CPT

## 2024-07-29 PROCEDURE — 97530 THERAPEUTIC ACTIVITIES: CPT

## 2024-07-29 NOTE — PROGRESS NOTES
PHYSICAL / OCCUPATIONAL THERAPY - DAILY TREATMENT NOTE    Patient Name: Vivien Bashir    Date: 2024    : 1945  Insurance: Payor: MEDICARE / Plan: MEDICARE PART A AND B / Product Type: *No Product type* /      Patient  verified Yes     Visit #   Current / Total 15 16   Time   In / Out 1055 1143   Pain   In / Out 2 0   Subjective Functional Status/Changes: Patient states she had a flare up with her back this morning and she was able to bring the symptoms down with performing her stretches.      TREATMENT AREA =  Other low back pain [M54.59]     OBJECTIVE    Heat (UNBILLED):  location/position: sitting; back  .    Min Rationale   10 decrease pain to improve patient's ability to progress to PLOF and address remaining functional goals.     Skin assessment post-treatment:   Intact     Therapeutic Procedures:    43811 Therapeutic Exercise (timed):  increase ROM, strength, coordination, balance, and proprioception to improve patient's ability to progress to PLOF and address remaining functional goals.   Tx Min Billable or 1:1 Min   (if diff from Tx Min) Details:   15  See flow sheet as applicable     05832 Neuromuscular Re-Education (timed):  improve balance, coordination, kinesthetic sense, posture, core stability and proprioception to improve patient's ability to develop conscious control of individual muscles and awareness of position of extremities in order to progress to PLOF and address remaining functional goals.   Tx Min Billable or 1:1 Min   (if diff from Tx Min) Details:   15  See flow sheet as applicable     38400 Therapeutic Activity (timed):  use of dynamic activities replicating functional movements to increase ROM, strength, coordination, balance, and proprioception in order to improve patient's ability to progress to PLOF and address remaining functional goals.   Tx Min Billable or 1:1 Min   (if diff from Tx Min) Details:   8  See flow sheet as applicable       38   [facility] Totals Reminder:

## 2024-07-31 ENCOUNTER — HOSPITAL ENCOUNTER (OUTPATIENT)
Facility: HOSPITAL | Age: 79
Setting detail: RECURRING SERIES
Discharge: HOME | End: 2024-08-03
Payer: MEDICARE

## 2024-07-31 PROCEDURE — 97530 THERAPEUTIC ACTIVITIES: CPT

## 2024-07-31 PROCEDURE — 97110 THERAPEUTIC EXERCISES: CPT

## 2024-07-31 PROCEDURE — 97112 NEUROMUSCULAR REEDUCATION: CPT

## 2024-07-31 NOTE — PROGRESS NOTES
Physical Therapy Discharge Instructions      [facility] - [address] [address] Ph: [phone] Fx: [phone]    Patient: Vivien Bashir  : 1945      Continue Home Exercise Program 1 time per day       Continue with    [] Ice  as needed      [] Heat           Follow up with MD:     [] Upon completion of therapy     [x] As needed      Recommendations:     [x]   Return to activity with home program    []   Return to activity with the following modifications:       []Post Rehab Program    []Join Independent aquatic program     []Return to/join local gym        RIVER AMES, PTA 2024 12:07 PM

## 2024-07-31 NOTE — THERAPY DISCHARGE
[facility]  [address] [address] Ph: [phone] Fx: [phone]  DISCHARGE SUMMARY  Patient Name: Vivien Bashir : 1945   Treatment/Medical Diagnosis: Other low back pain [M54.59]   Referral Source: Kiara Yi, AZIZA - *     Date of Initial Visit: 6/3/2024 Attended Visits: 16 Missed Visits: 1     SUMMARY OF TREATMENT    Patient reports 98% improvement with overall back symptoms. Patient has noticed improvement with tolerance to activities before requiring a seated rest break with performing house hold chores. Patient has improved ambulation tolerance to 5 mins with no support.         CURRENT STATUS  Short Term Goals: To be accomplished in  3-4  weeks:  1. Independent with HEP.  EVAL: NA  Current: MET: pt reports performing HEP. 24  2. Decrease max pain 25-50% to assist with amb/stand > 10 min.  EVAL: pain 8/10 max  Current:  Pain at most this past week 3/10.  2024.  Goal Met.  3. Will be able to perform supine bridge, 5 second hold without increase pain to show improved core strength and bed mobility.  EVAL: painful supine bridge  Current: MET: no pain with bridges with 5 second hold 24     Long Term Goals: To be accomplished in  6-8  weeks:  1.  Decrease max pain 50-75% to assist with amb/stand > 20 min and amb in store without leaning on cart.  EVAL: pain 8/10 max, using cart to lean on at store  Current:  progressing. Ambulation tolerance 5 mins 24  2.  Improve Oswestry Disability Index (LUCIO) for Low Back Pain by 13% points in order to show significant functional improvement.  EVAL: OSW = 32%  Current: MET: OSQ = 61%. 24  3.  Will be indep with HEP and prepared to DC to HEP for self management.  EVAL:  NA  Current; MET: pt reports performing HEP daily. 24         Medicare: Reporting Period (date from last assessment to current assessment): 2024-2024    RECOMMENDATIONS  Discontinue therapy.

## 2024-07-31 NOTE — PROGRESS NOTES
PHYSICAL / OCCUPATIONAL THERAPY - DAILY TREATMENT NOTE    Patient Name: Vivien Bashir    Date: 2024    : 1945  Insurance: Payor: MEDICARE / Plan: MEDICARE PART A AND B / Product Type: *No Product type* /      Patient  verified Yes     Visit #   Current / Total 16 16   Time   In / Out 1053 1145   Pain   In / Out 0 0   Subjective Functional Status/Changes: Patient reports she feels she can perform more house hold chores before having to sit down for a break.      TREATMENT AREA =  Other low back pain [M54.59]     OBJECTIVE    Heat (UNBILLED):  location/position: sitting; back  .    Min Rationale   10 decrease pain to improve patient's ability to progress to PLOF and address remaining functional goals.     Skin assessment post-treatment:   Intact      Therapeutic Procedures:     12257 Therapeutic Exercise (timed):  increase ROM, strength, coordination, balance, and proprioception to improve patient's ability to progress to PLOF and address remaining functional goals.   Tx Min Billable or 1:1 Min   (if diff from Tx Min) Details:   17   See flow sheet as applicable      09219 Neuromuscular Re-Education (timed):  improve balance, coordination, kinesthetic sense, posture, core stability and proprioception to improve patient's ability to develop conscious control of individual muscles and awareness of position of extremities in order to progress to PLOF and address remaining functional goals.   Tx Min Billable or 1:1 Min   (if diff from Tx Min) Details:   15   See flow sheet as applicable      34908 Therapeutic Activity (timed):  use of dynamic activities replicating functional movements to increase ROM, strength, coordination, balance, and proprioception in order to improve patient's ability to progress to PLOF and address remaining functional goals.   Tx Min Billable or 1:1 Min   (if diff from Tx Min) Details:   10   See flow sheet as applicable         42   [facility] Totals Reminder: bill using total

## 2024-11-28 DIAGNOSIS — M51.27 LUMBOSACRAL DISC HERNIATION: ICD-10-CM

## 2024-11-28 DIAGNOSIS — M54.32 LEFT SCIATIC NERVE PAIN: ICD-10-CM

## 2024-11-28 DIAGNOSIS — M47.816 LUMBAR SPONDYLOSIS: ICD-10-CM

## 2024-11-30 RX ORDER — PREGABALIN 75 MG/1
CAPSULE ORAL
Qty: 90 CAPSULE | OUTPATIENT
Start: 2024-11-30

## 2024-12-04 DIAGNOSIS — M51.27 LUMBOSACRAL DISC HERNIATION: ICD-10-CM

## 2024-12-04 DIAGNOSIS — M47.816 LUMBAR SPONDYLOSIS: ICD-10-CM

## 2024-12-04 DIAGNOSIS — M54.32 LEFT SCIATIC NERVE PAIN: ICD-10-CM

## 2024-12-04 RX ORDER — PREGABALIN 75 MG/1
CAPSULE ORAL
Qty: 90 CAPSULE | OUTPATIENT
Start: 2024-12-04

## 2024-12-13 ENCOUNTER — OFFICE VISIT (OUTPATIENT)
Age: 79
End: 2024-12-13
Payer: MEDICARE

## 2024-12-13 VITALS
WEIGHT: 212.8 LBS | DIASTOLIC BLOOD PRESSURE: 64 MMHG | RESPIRATION RATE: 18 BRPM | HEIGHT: 64 IN | TEMPERATURE: 98.1 F | HEART RATE: 69 BPM | SYSTOLIC BLOOD PRESSURE: 133 MMHG | OXYGEN SATURATION: 97 % | BODY MASS INDEX: 36.33 KG/M2

## 2024-12-13 DIAGNOSIS — M54.32 LEFT SCIATIC NERVE PAIN: ICD-10-CM

## 2024-12-13 DIAGNOSIS — M51.27 LUMBOSACRAL DISC HERNIATION: ICD-10-CM

## 2024-12-13 DIAGNOSIS — M47.816 LUMBAR SPONDYLOSIS: ICD-10-CM

## 2024-12-13 PROCEDURE — 1123F ACP DISCUSS/DSCN MKR DOCD: CPT | Performed by: NURSE PRACTITIONER

## 2024-12-13 PROCEDURE — G8427 DOCREV CUR MEDS BY ELIG CLIN: HCPCS | Performed by: NURSE PRACTITIONER

## 2024-12-13 PROCEDURE — G8417 CALC BMI ABV UP PARAM F/U: HCPCS | Performed by: NURSE PRACTITIONER

## 2024-12-13 PROCEDURE — G8400 PT W/DXA NO RESULTS DOC: HCPCS | Performed by: NURSE PRACTITIONER

## 2024-12-13 PROCEDURE — 1036F TOBACCO NON-USER: CPT | Performed by: NURSE PRACTITIONER

## 2024-12-13 PROCEDURE — G8484 FLU IMMUNIZE NO ADMIN: HCPCS | Performed by: NURSE PRACTITIONER

## 2024-12-13 PROCEDURE — 1125F AMNT PAIN NOTED PAIN PRSNT: CPT | Performed by: NURSE PRACTITIONER

## 2024-12-13 PROCEDURE — 1160F RVW MEDS BY RX/DR IN RCRD: CPT | Performed by: NURSE PRACTITIONER

## 2024-12-13 PROCEDURE — 1090F PRES/ABSN URINE INCON ASSESS: CPT | Performed by: NURSE PRACTITIONER

## 2024-12-13 PROCEDURE — 99213 OFFICE O/P EST LOW 20 MIN: CPT | Performed by: NURSE PRACTITIONER

## 2024-12-13 PROCEDURE — 1159F MED LIST DOCD IN RCRD: CPT | Performed by: NURSE PRACTITIONER

## 2024-12-13 RX ORDER — PREGABALIN 75 MG/1
75 CAPSULE ORAL NIGHTLY
Qty: 90 CAPSULE | Refills: 1 | Status: SHIPPED | OUTPATIENT
Start: 2024-12-13 | End: 2025-06-11

## 2024-12-13 RX ORDER — PREGABALIN 50 MG/1
50 CAPSULE ORAL EVERY MORNING
Qty: 90 CAPSULE | Refills: 1 | Status: SHIPPED | OUTPATIENT
Start: 2024-12-13 | End: 2025-06-11

## 2024-12-13 NOTE — PROGRESS NOTES
Vivien Bashir presents today for   Chief Complaint   Patient presents with    Back Problem    Pain    Back Pain       Is someone accompanying this pt? no    Is the patient using any DME equipment during OV? no    Depression Screening:       No data to display                Learning Assessment:  Failed to redirect to the Timeline version of the [employer] SmartLink.    Abuse Screening:       No data to display                Fall Risk  Failed to redirect to the Timeline version of the [employer] SmartLink.    OPIOID RISK TOOL  Failed to redirect to the Timeline version of the [employer] SmartLink.    Coordination of Care:  1. Have you been to the ER, urgent care clinic since your last visit? no  Hospitalized since your last visit?no    2. Have you seen or consulted any other health care providers outside of the [facility] since your last visit? no Include any pap smears or colon screening. no

## 2024-12-13 NOTE — PROGRESS NOTES
Chief complaint   Chief Complaint   Patient presents with    Back Problem    Pain    Back Pain       History of Present Illness:  Vivien Bashir is a  78 y.o.  female who has a lumbar disc herniation and spondylosis that was given her back pain and right-sided lower extremity pain down to her foot.  Her radicular pain is controlled with Lyrica 50 mg in the morning and 75 mg at night.  She was intolerant to higher doses.  Today she states she ran out of of the 75 mg dose for the last 2 weeks and has been taking 50 mg twice a day but it is not controlling her pain as well as a 70 5 at night does.  She would like to get her prescriptions refilled.  She states at least now she knows the medication does work for her.  She denies fever bowel bladder dysfunction.     Physical Exam: The patient is a 78-year-old female well-developed well-nourished who is alert and oriented with a normal mood and affect.  She has a full weightbearing nonantalgic gait.  Did not use any assist device.  She has 4 out of 5 strength bilateral lower extremities.  Negative straight leg raise.  She has pain with hyperextension lumbar spine.           Assessment/Plan: This is a patient with lumbar disc herniation and spondylosis his pain is well controlled with Lyrica 50 mg in the morning and 75 mg at night.  I have sent in refills of her Lyrica for her.  I will see her back in 6 months sooner if needed      Vivien was seen today for back problem, pain and back pain.    Diagnoses and all orders for this visit:    Lumbar spondylosis  -     pregabalin (LYRICA) 50 MG capsule; Take 1 capsule by mouth every morning for 180 days. Max Daily Amount: 50 mg  -     pregabalin (LYRICA) 75 MG capsule; Take 1 capsule by mouth at bedtime for 180 days. One po bid Max Daily Amount: 75 mg    Lumbosacral disc herniation  -     pregabalin (LYRICA) 50 MG capsule; Take 1 capsule by mouth every morning for 180 days. Max Daily Amount: 50 mg  -     pregabalin (LYRICA) 75

## 2024-12-16 ENCOUNTER — TELEPHONE (OUTPATIENT)
Age: 79
End: 2024-12-16

## 2024-12-16 DIAGNOSIS — M54.32 LEFT SCIATIC NERVE PAIN: ICD-10-CM

## 2024-12-16 DIAGNOSIS — M47.816 LUMBAR SPONDYLOSIS: ICD-10-CM

## 2024-12-16 DIAGNOSIS — M51.27 LUMBOSACRAL DISC HERNIATION: ICD-10-CM

## 2024-12-16 NOTE — TELEPHONE ENCOUNTER
Kiara Yi, APRN - NP  You1 hour ago (9:52 AM)       It is the lyrica 75 mg which is to be taken at night.  Looks like the rx says bid.  Call pharmacy and clarify it.  She takes a 50 mg tablet every morning.

## 2024-12-16 NOTE — TELEPHONE ENCOUNTER
Attempted to contact the pharmacy twice. Have been on hold for almost 10 minutes and system keeps dumping call. Will continue to try at another time.

## 2024-12-16 NOTE — TELEPHONE ENCOUNTER
Patient stated that the script for Lyrica 75mg as written incorrect and the pharmacy can not fill the RX    Please advise patient @ [phone]

## 2024-12-16 NOTE — TELEPHONE ENCOUNTER
I called and spoke to pharmacist at [facility]. Both the 50 mg and 75 mg prescriptions have confusing directions. The 50 mg Rx says to continue taking 100 mg QHS and the 75 mg one had to take 1 bid. The pharmacist is requesting new prescriptions since they are controlled medications. New prescriptions have been pended and directions changed.

## 2024-12-17 RX ORDER — PREGABALIN 75 MG/1
75 CAPSULE ORAL NIGHTLY
Qty: 90 CAPSULE | Refills: 1 | Status: SHIPPED | OUTPATIENT
Start: 2024-12-17 | End: 2025-06-15

## 2024-12-17 RX ORDER — PREGABALIN 50 MG/1
50 CAPSULE ORAL EVERY MORNING
Qty: 90 CAPSULE | Refills: 1 | Status: SHIPPED | OUTPATIENT
Start: 2024-12-17 | End: 2025-06-15

## 2025-07-13 DIAGNOSIS — M54.32 LEFT SCIATIC NERVE PAIN: ICD-10-CM

## 2025-07-13 DIAGNOSIS — M47.816 LUMBAR SPONDYLOSIS: ICD-10-CM

## 2025-07-13 DIAGNOSIS — M51.27 LUMBOSACRAL DISC HERNIATION: ICD-10-CM

## 2025-07-13 RX ORDER — PREGABALIN 50 MG/1
CAPSULE ORAL
Qty: 30 CAPSULE | Refills: 0 | Status: SHIPPED | OUTPATIENT
Start: 2025-07-13 | End: 2025-08-12

## 2025-07-31 ENCOUNTER — OFFICE VISIT (OUTPATIENT)
Age: 80
End: 2025-07-31
Payer: MEDICARE

## 2025-07-31 VITALS
RESPIRATION RATE: 18 BRPM | HEIGHT: 64 IN | SYSTOLIC BLOOD PRESSURE: 115 MMHG | OXYGEN SATURATION: 95 % | WEIGHT: 208.6 LBS | DIASTOLIC BLOOD PRESSURE: 60 MMHG | BODY MASS INDEX: 35.61 KG/M2 | HEART RATE: 69 BPM | TEMPERATURE: 98.2 F

## 2025-07-31 DIAGNOSIS — M54.32 LEFT SCIATIC NERVE PAIN: ICD-10-CM

## 2025-07-31 DIAGNOSIS — M47.816 LUMBAR SPONDYLOSIS: ICD-10-CM

## 2025-07-31 DIAGNOSIS — M51.27 LUMBOSACRAL DISC HERNIATION: ICD-10-CM

## 2025-07-31 PROCEDURE — 1160F RVW MEDS BY RX/DR IN RCRD: CPT | Performed by: NURSE PRACTITIONER

## 2025-07-31 PROCEDURE — 1090F PRES/ABSN URINE INCON ASSESS: CPT | Performed by: NURSE PRACTITIONER

## 2025-07-31 PROCEDURE — G8400 PT W/DXA NO RESULTS DOC: HCPCS | Performed by: NURSE PRACTITIONER

## 2025-07-31 PROCEDURE — 1123F ACP DISCUSS/DSCN MKR DOCD: CPT | Performed by: NURSE PRACTITIONER

## 2025-07-31 PROCEDURE — 1036F TOBACCO NON-USER: CPT | Performed by: NURSE PRACTITIONER

## 2025-07-31 PROCEDURE — 99214 OFFICE O/P EST MOD 30 MIN: CPT | Performed by: NURSE PRACTITIONER

## 2025-07-31 PROCEDURE — 1159F MED LIST DOCD IN RCRD: CPT | Performed by: NURSE PRACTITIONER

## 2025-07-31 PROCEDURE — G8427 DOCREV CUR MEDS BY ELIG CLIN: HCPCS | Performed by: NURSE PRACTITIONER

## 2025-07-31 PROCEDURE — 1126F AMNT PAIN NOTED NONE PRSNT: CPT | Performed by: NURSE PRACTITIONER

## 2025-07-31 PROCEDURE — G8417 CALC BMI ABV UP PARAM F/U: HCPCS | Performed by: NURSE PRACTITIONER

## 2025-07-31 RX ORDER — PREGABALIN 75 MG/1
75 CAPSULE ORAL NIGHTLY
Qty: 90 CAPSULE | Refills: 1 | Status: SHIPPED | OUTPATIENT
Start: 2025-07-31 | End: 2026-01-27

## 2025-07-31 RX ORDER — PREGABALIN 50 MG/1
50 CAPSULE ORAL EVERY MORNING
Qty: 90 CAPSULE | Refills: 1 | Status: SHIPPED | OUTPATIENT
Start: 2025-07-31 | End: 2026-01-27

## 2025-07-31 ASSESSMENT — PATIENT HEALTH QUESTIONNAIRE - PHQ9
1. LITTLE INTEREST OR PLEASURE IN DOING THINGS: NOT AT ALL
SUM OF ALL RESPONSES TO PHQ QUESTIONS 1-9: 0
SUM OF ALL RESPONSES TO PHQ QUESTIONS 1-9: 0
2. FEELING DOWN, DEPRESSED OR HOPELESS: NOT AT ALL
SUM OF ALL RESPONSES TO PHQ QUESTIONS 1-9: 0
SUM OF ALL RESPONSES TO PHQ QUESTIONS 1-9: 0

## 2025-07-31 NOTE — PROGRESS NOTES
Vivien Bashir presents today for   Chief Complaint   Patient presents with    Back Problem    Pain    Back Pain       Is someone accompanying this pt? no    Is the patient using any DME equipment during OV? no    Depression Screening:       No data to display                Learning Assessment:  Failed to redirect to the Timeline version of the [employer] SmartLink.    Abuse Screening:       No data to display                Fall Risk  Failed to redirect to the Timeline version of the [employer] SmartLink.    OPIOID RISK TOOL  Failed to redirect to the Timeline version of the [employer] SmartLink.    Coordination of Care:  1. Have you been to the ER, urgent care clinic since your last visit? no  Hospitalized since your last visit? no    2. Have you seen or consulted any other health care providers outside of the [facility] since your last visit? no Include any pap smears or colon screening. no

## 2025-07-31 NOTE — PROGRESS NOTES
Chief complaint   Chief Complaint   Patient presents with    Back Problem    Pain    Back Pain       History of Present Illness:  Vivien Bashir is a  79 y.o.  female who has a lumbar disc herniation and spondylosis that was given her back pain and right-sided lower extremity pain down to her foot.  Today she states she is doing about the same.  She does state very occasionally she will feel a twinge in her right hip but then it goes away.  Her radicular pain is controlled with Lyrica 50 mg in the morning and 75 mg at night.  She was intolerant to higher doses.  She has run out in the past and then has increased pain so she knows it does work for her.  She denies fever bowel bladder dysfunction.     Physical Exam: The patient is a 79-year-old female well-developed well-nourished who is alert and oriented with a normal mood and affect.  She has a full weightbearing nonantalgic gait.  She does not  use any assist device.  She has 4 out of 5 strength bilateral lower extremities.  Negative straight leg raise.  She has pain with hyperextension lumbar spine.           Assessment/Plan: This is a patient with lumbar disc herniation and spondylosis whose pain is well controlled with Lyrica 50 mg in the morning and 75 mg at night.  I have sent in refills of her Lyrica for her.  I will see her back in 6 months sooner if needed       Vivien was seen today for back problem, pain and back pain.    Diagnoses and all orders for this visit:    Lumbar spondylosis  -     pregabalin (LYRICA) 75 MG capsule; Take 1 capsule by mouth at bedtime for 180 days. Max Daily Amount: 75 mg  -     pregabalin (LYRICA) 50 MG capsule; Take 1 capsule by mouth every morning for 180 days. Max Daily Amount: 50 mg    Lumbosacral disc herniation  -     pregabalin (LYRICA) 75 MG capsule; Take 1 capsule by mouth at bedtime for 180 days. Max Daily Amount: 75 mg  -     pregabalin (LYRICA) 50 MG capsule; Take 1 capsule by mouth every morning for 180 days. Max

## (undated) DEVICE — MEDI-VAC NON-CONDUCTIVE SUCTION TUBING: Brand: CARDINAL HEALTH

## (undated) DEVICE — SYRINGE MED 25GA 3ML L5/8IN SUBQ PLAS W/ DETACH NDL SFTY

## (undated) DEVICE — BITE BLOCK ENDOSCP UNIV AD 6 TO 9.4 MM

## (undated) DEVICE — CATH IV SAFE STR 22GX1IN BLU -- PROTECTIV PLUS

## (undated) DEVICE — STERILE POLYISOPRENE POWDER-FREE SURGICAL GLOVES: Brand: PROTEXIS

## (undated) DEVICE — SOLUTION IRRIG 1000ML H2O STRL BLT

## (undated) DEVICE — BASIN EMESIS 500CC ROSE 250/CS 60/PLT: Brand: MEDEGEN MEDICAL PRODUCTS, LLC

## (undated) DEVICE — AIRLIFE™ NASAL OXYGEN CANNULA CURVED, FLARED TIP WITH 14 FOOT (4.3 M) CRUSH-RESISTANT TUBING, OVER-THE-EAR STYLE: Brand: AIRLIFE™

## (undated) DEVICE — FLEX ADVANTAGE 3000CC: Brand: FLEX ADVANTAGE

## (undated) DEVICE — Device

## (undated) DEVICE — CATHETER SUCT TR FL TIP 14FR W/ O CTRL

## (undated) DEVICE — MEDI-VAC SUCTION HIGH CAPACITY: Brand: CARDINAL HEALTH

## (undated) DEVICE — GAUZE SPONGES,16 PLY: Brand: CURITY

## (undated) DEVICE — FLUFF AND POLYMER UNDERPAD,EXTRA HEAVY: Brand: WINGS

## (undated) DEVICE — ENDOSCOPY PUMP TUBING/ CAP SET: Brand: ERBE

## (undated) DEVICE — SYR 50ML SLIP TIP NSAF LF STRL --